# Patient Record
Sex: MALE | Race: WHITE | NOT HISPANIC OR LATINO | Employment: OTHER | URBAN - METROPOLITAN AREA
[De-identification: names, ages, dates, MRNs, and addresses within clinical notes are randomized per-mention and may not be internally consistent; named-entity substitution may affect disease eponyms.]

---

## 2018-01-12 LAB
A1C: 6.6
CHOLESTEROL, TOTAL: 137
EAG (MG/DL): 133.78 MG/DL
GLUCOSE: 224
HDLC SERPL-MCNC: 33 MG/DL
LDLC SERPL CALC-MCNC: 73 MG/DL (ref 0–160)
TRIGL SERPL-MCNC: 155 MG/DL

## 2018-05-08 ENCOUNTER — OFFICE VISIT (OUTPATIENT)
Dept: INTERNAL MEDICINE | Facility: CLINIC | Age: 68
End: 2018-05-08
Payer: MEDICARE

## 2018-05-08 VITALS
WEIGHT: 313.25 LBS | HEIGHT: 73 IN | TEMPERATURE: 97 F | BODY MASS INDEX: 41.52 KG/M2 | SYSTOLIC BLOOD PRESSURE: 138 MMHG | HEART RATE: 97 BPM | DIASTOLIC BLOOD PRESSURE: 80 MMHG | OXYGEN SATURATION: 97 %

## 2018-05-08 DIAGNOSIS — C91.51: Chronic | ICD-10-CM

## 2018-05-08 DIAGNOSIS — E66.01 CLASS 3 SEVERE OBESITY DUE TO EXCESS CALORIES WITH SERIOUS COMORBIDITY AND BODY MASS INDEX (BMI) OF 40.0 TO 44.9 IN ADULT: Chronic | ICD-10-CM

## 2018-05-08 DIAGNOSIS — G47.33 OBSTRUCTIVE SLEEP APNEA TREATED WITH CONTINUOUS POSITIVE AIRWAY PRESSURE (CPAP): Chronic | ICD-10-CM

## 2018-05-08 DIAGNOSIS — E11.42 CONTROLLED TYPE 2 DIABETES MELLITUS WITH DIABETIC POLYNEUROPATHY, WITHOUT LONG-TERM CURRENT USE OF INSULIN: Chronic | ICD-10-CM

## 2018-05-08 DIAGNOSIS — M19.91 PRIMARY LOCALIZED OSTEOARTHROSIS OF MULTIPLE SITES: Chronic | ICD-10-CM

## 2018-05-08 DIAGNOSIS — I10 BENIGN ESSENTIAL HYPERTENSION: Chronic | ICD-10-CM

## 2018-05-08 DIAGNOSIS — G89.29 OTHER CHRONIC PAIN: Chronic | ICD-10-CM

## 2018-05-08 DIAGNOSIS — F41.1 GENERALIZED ANXIETY DISORDER: Chronic | ICD-10-CM

## 2018-05-08 DIAGNOSIS — J30.1 SEASONAL ALLERGIC RHINITIS DUE TO POLLEN: Chronic | ICD-10-CM

## 2018-05-08 PROCEDURE — 99205 OFFICE O/P NEW HI 60 MIN: CPT | Mod: PBBFAC,PO | Performed by: FAMILY MEDICINE

## 2018-05-08 PROCEDURE — 99214 OFFICE O/P EST MOD 30 MIN: CPT | Mod: S$PBB,,, | Performed by: FAMILY MEDICINE

## 2018-05-08 PROCEDURE — 99999 PR PBB SHADOW E&M-NEW PATIENT-LVL V: CPT | Mod: PBBFAC,,, | Performed by: FAMILY MEDICINE

## 2018-05-08 RX ORDER — DESOXIMETASONE 2.5 MG/G
CREAM TOPICAL
COMMUNITY
Start: 2016-12-30

## 2018-05-08 RX ORDER — FLUTICASONE PROPIONATE 50 MCG
1 SPRAY, SUSPENSION (ML) NASAL
COMMUNITY

## 2018-05-08 RX ORDER — ESCITALOPRAM OXALATE 10 MG/1
10 TABLET ORAL
COMMUNITY
Start: 2018-02-14 | End: 2018-05-29

## 2018-05-08 RX ORDER — HYDROCODONE BITARTRATE AND ACETAMINOPHEN 10; 325 MG/1; MG/1
1 TABLET ORAL EVERY 6 HOURS PRN
Refills: 0 | COMMUNITY
Start: 2018-02-01

## 2018-05-08 RX ORDER — DEXTROSE 4 G
TABLET,CHEWABLE ORAL
COMMUNITY
Start: 2017-10-27 | End: 2018-07-26 | Stop reason: SDUPTHER

## 2018-05-08 RX ORDER — BLOOD-GLUCOSE CONTROL, NORMAL
30 EACH MISCELLANEOUS
COMMUNITY
Start: 2017-10-20 | End: 2018-07-26 | Stop reason: SDUPTHER

## 2018-05-08 RX ORDER — OXYBUTYNIN CHLORIDE 15 MG/1
15 TABLET, EXTENDED RELEASE ORAL DAILY
Refills: 0 | COMMUNITY
Start: 2018-04-10 | End: 2018-05-29

## 2018-05-08 RX ORDER — CLOTRIMAZOLE AND BETAMETHASONE DIPROPIONATE 10; .64 MG/G; MG/G
CREAM TOPICAL
Refills: 0 | COMMUNITY
Start: 2018-04-03 | End: 2018-07-26

## 2018-05-08 RX ORDER — LANCETS
EACH MISCELLANEOUS
COMMUNITY
Start: 2017-10-16 | End: 2018-05-29 | Stop reason: SDUPTHER

## 2018-05-08 RX ORDER — GLIMEPIRIDE 2 MG/1
TABLET ORAL
COMMUNITY
Start: 2017-11-07 | End: 2018-06-20 | Stop reason: SDUPTHER

## 2018-05-08 RX ORDER — TAMSULOSIN HYDROCHLORIDE 0.4 MG/1
CAPSULE ORAL
COMMUNITY
Start: 2018-02-14 | End: 2018-05-30 | Stop reason: SDUPTHER

## 2018-05-08 RX ORDER — CLONAZEPAM 1 MG/1
1 TABLET ORAL NIGHTLY
Refills: 0 | COMMUNITY
Start: 2018-04-17 | End: 2018-05-30 | Stop reason: SDUPTHER

## 2018-05-08 RX ORDER — NAPROXEN SODIUM 220 MG/1
81 TABLET, FILM COATED ORAL
COMMUNITY

## 2018-05-08 RX ORDER — KETOCONAZOLE 20 MG/G
CREAM TOPICAL
COMMUNITY
Start: 2017-07-17

## 2018-05-08 RX ORDER — TRAMADOL HYDROCHLORIDE 50 MG/1
TABLET ORAL
COMMUNITY
Start: 2017-11-14 | End: 2018-12-10 | Stop reason: SDUPTHER

## 2018-05-08 RX ORDER — IPRATROPIUM BROMIDE 42 UG/1
SPRAY, METERED NASAL
COMMUNITY
Start: 2017-11-01

## 2018-05-08 RX ORDER — MONTELUKAST SODIUM 10 MG/1
10 TABLET ORAL
COMMUNITY
Start: 2017-11-01

## 2018-05-08 RX ORDER — BENAZEPRIL HYDROCHLORIDE 20 MG/1
20 TABLET ORAL
COMMUNITY
End: 2018-05-29 | Stop reason: DRUGHIGH

## 2018-05-08 NOTE — PROGRESS NOTES
Patient answers are not available for this visit.    CHIEF COMPLAINT  Establish Care      This is my first time treating him here. All problems addressed today are NEW TO ME.    HISTORY OF PRESENT ILLNESS    PROBLEM/CONDITION: Primary complaint is chronic musculoskeletal pain problems related to prior trauma and prior surgeries and degenerative joint disease and degenerative spine disease. He is under the care of pain management specialist Dr. Kc. He says he is contemplating getting a second opinion regarding pain management, and I offered to schedule this for him, but he decided to defer.    PROBLEM/CONDITION: He reports compliance with use of his CPAP for treatment of his obstructive sleep apnea.    PROBLEM/CONDITION: Type 2 diabetes mellitus appears well-controlled. No symptomatic hypoglycemia or hyperglycemia reported.    PROBLEM/CONDITION: Hypertension appears well-controlled. No angina or angina equivalent symptoms reported.    PROBLEM/CONDITION: Anxiety appears fairly controlled. No paranoia or disordered thinking reported.    PROBLEM/CONDITION: Allergic rhinitis appears fairly controlled.    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    NARRATIVE HISTORY: He provides the following list of his prior treating physicians, and I am requesting relevant records from them: ENT  Dr. Coyle, spine specialist Dr. Patel, ENT Dr. Malcolm, orthopedic (ankle) surgeon Dr. Lopez, pulmonologist Dr. Tolliver, orthopedic (ankle) surgeon Dr. Reddy, orthopedic surgeon Dr. Jeffrey, gastroenterologist Dr. Monson, rheumatologist  Dr. Avila, urologist  Dr. Irizarry, allergist  Dr. Chao, orthopedic (hand) surgeon  Dr. French, dermatologist Dr. Yusuf, cardiologist  Dr. Gramajo, hematologist-oncologist Dr. Lyon, endocrinologist Dr. Cain, podiatrist Dr. Baldwin, and pain management specialist Dr. Kc. He  says that he had labs to evaluate the status of his diabetes and other conditions performed only 2 to 3 months ago, so it was agreed to defer further lab testing until I have had an opportunity to review his previous test results.    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)       Endocrine    Controlled type 2 diabetes mellitus with diabetic polyneuropathy, without long-term current use of insulin (Chronic)       Orthopedic    Primary localized osteoarthrosis of multiple sites (Chronic)       Other    Class 3 severe obesity due to excess calories with serious comorbidity and body mass index (BMI) of 40.0 to 44.9 in adult (Chronic)    Seasonal allergic rhinitis due to pollen (Chronic)    Obstructive sleep apnea treated with continuous positive airway pressure (CPAP) (Chronic)          PAST MEDICAL HISTORY REVIEWED AND UPDATED  Past Medical History:   Diagnosis Date    Adult T-cell leukemia in remission 5/8/2018    B12 deficiency     Benign essential hypertension 5/8/2018    Chronic pain (degenerative spine and joints, multiple sites) 5/8/2018    Class 3 severe obesity due to excess calories with serious comorbidity and body mass index (BMI) of 40.0 to 44.9 in adult 5/8/2018    GERD (gastroesophageal reflux disease)     Hyperlipidemia     Hypogonadism in male     Lumbar disc disease     OA (osteoarthritis of spine)     Obesity     Obstructive sleep apnea treated with continuous positive airway pressure (CPAP) 5/8/2018    PAF (paroxysmal atrial fibrillation)     Pancytopenia     Seasonal allergic rhinitis due to pollen 05/08/2018    Venous stasis dermatitis of both lower extremities        SURGICAL HISTORY REVIEWED AND  UPDATED  History reviewed. No pertinent surgical history.    CURRENT MEDICATION LIST REVIEWED AND UPDATED  Outpatient Prescriptions Marked as Taking for the 5/8/18 encounter (Office Visit) with YVON Galaviz MD   Medication Sig Dispense Refill    blood sugar diagnostic Strp Use to check blood sugar daily      blood sugar diagnostic Strp Use to check blood sugar daily      blood-glucose meter Misc Use to check blood sugar daily      desoximetasone (TOPICORT) 0.25 % cream Apply to affected area daily for 2 weeks      escitalopram oxalate (LEXAPRO) 10 MG tablet Take 10 mg by mouth.      glimepiride (AMARYL) 2 MG tablet take 1 tablet by mouth every morning      ipratropium (ATROVENT) 42 mcg (0.06 %) nasal spray instill 2 sprays into each nostril three times a day      ketoconazole (NIZORAL) 2 % cream Apply bid      lancets 30 gauge Misc 30 g by Other route.      lancets Misc Use to check blood sugar daily      montelukast (SINGULAIR) 10 mg tablet Take 10 mg by mouth.      SITagliptin (JANUVIA) 100 MG Tab take 1 tablet by mouth once daily      tamsulosin (FLOMAX) 0.4 mg Cp24 take 1 capsule by mouth once daily      traMADol (ULTRAM) 50 mg tablet 1-2 tabs po Q4 hr prn pain          ALLERGY LIST REVIEWED AND UPDATED  Allergies as of 05/08/2018 - Reviewed 05/08/2018   Allergen Reaction Noted    Levofloxacin  09/19/2016    Sulfa (sulfonamide antibiotics)  12/19/2015    Adhesive Rash 03/07/2016       SOCIAL HISTORY REVIEWED AND UPDATED  TOBACCO USE HISTORY:   History   Smoking Status    Never Smoker   Smokeless Tobacco    Never Used     ALCOHOL USE HISTORY:  History   Alcohol Use    Yes     Comment: rare     RECREATIONAL DRUG USE HISTORY:  History   Drug Use No       FAMILY HISTORY REVIEWED AND UPDATED  History reviewed. No pertinent family history.    REVIEW OF SYSTEMS  CONSTITUTIONAL: No fever reported.   CARDIOVASCULAR: No angina reported.   PULMONARY: No hemoptysis reported.   PSYCHIATRIC: No  "mood instability reported.   NEUROLOGIC: No seizures reported.   ENDOCRINE: No polydipsia reported.   GASTROINTESTINAL: No blood in stool reported.   GENITOURINARY: No hematuria reported.   ENT: No acute hearing changes reported.   OPHTHALMOLOGIC: No acute vision changes reported.   HEMATOLOGIC: No bleeding problems reported.   MUSCULOSKELETAL: No recent injuries reported.   DERMATOLOGIC: No skin infection reported.   REMAINDER OF COMPLETE REVIEW OF SYSTEMS is negative or noncontributory to present illness except as noted herein.    PHYSICAL EXAM  Vitals:    05/08/18 1007   BP: 138/80   BP Location: Right arm   Patient Position: Sitting   BP Method: Large (Manual)   Pulse: 97   Temp: 97.2 °F (36.2 °C)   TempSrc: Tympanic   SpO2: 97%   Weight: (!) 142.1 kg (313 lb 4.4 oz)   Height: 6' 1" (1.854 m)     CONSTITUTIONAL: Vital signs noted. No apparent distress. Does not appear acutely ill or septic. Appears adequately hydrated.   EYE: Sclerae anicteric. Lids and conjunctiva unremarkable.   ENT: External ENT unremarkable. Oropharynx moist.   NECK: Trachea midline. Thyroid nontender.   PULM: Lungs clear. Breathing unlabored.   CV: Auscultation reveals regular rate and rhythm without murmur, gallop or rub. No carotid bruit.   GI: Soft and nontender. Bowel sounds normal.   DERM: Skin warm and moist with normal turgor.   NEURO: There are no gross focal motor deficits or gross deficits of cranial nerves III-XII.   PSYCH: Alert and oriented x 3. Mood is grossly neutral. Affect appropriate. Judgment and insight not grossly compromised.   MSK: Grossly normal stance and gait for body habitus.    ASSESSMENT and PLAN  Controlled type 2 diabetes mellitus with diabetic polyneuropathy, without long-term current use of insulin    Class 3 severe obesity due to excess calories with serious comorbidity and body mass index (BMI) of 40.0 to 44.9 in adult    Chronic pain (degenerative spine and joints, multiple sites)    Adult T-cell leukemia " "in remission    Benign essential hypertension    Seasonal allergic rhinitis due to pollen    Obstructive sleep apnea treated with continuous positive airway pressure (CPAP)    Primary localized osteoarthrosis of multiple sites    Generalized anxiety disorder        PRESCRIPTION MEDICATION MANAGEMENT  Except as noted below, CURRENT MEDICATIONS are to remain unchanged from that listed above.     There are no discontinued medications.    Follow-up in about 2 weeks (around 5/22/2018) for re-evaluate problem(s) discussed today.    TOTAL TIME evaluating and managing this patient for this encounter exceeded 45 minutes, the majority spent counseling and coordinating care for the listed diagnoses.     ABOUT THIS DOCUMENTATION:  · The order of the conditions listed in the HPI is one of convenience and does not necessarily reflect the chronology of the appointment, nor the relative importance of a condition. It is possible that additional description or status details about condition(s) may be found elsewhere in the EHR documentation for today's encounter.  · Documentation entered by me for this encounter was done in part using speech-recognition technology. Although I have made an effort to ensure accuracy, "sound like" errors may exist and should be interpreted in context.                        -YVON Galaviz MD    There are no Patient Instructions on file for this visit.       "

## 2018-05-16 ENCOUNTER — PATIENT OUTREACH (OUTPATIENT)
Dept: ADMINISTRATIVE | Facility: HOSPITAL | Age: 68
End: 2018-05-16

## 2018-05-29 ENCOUNTER — LAB VISIT (OUTPATIENT)
Dept: LAB | Facility: HOSPITAL | Age: 68
End: 2018-05-29
Attending: FAMILY MEDICINE
Payer: MEDICARE

## 2018-05-29 ENCOUNTER — OFFICE VISIT (OUTPATIENT)
Dept: INTERNAL MEDICINE | Facility: CLINIC | Age: 68
End: 2018-05-29
Payer: MEDICARE

## 2018-05-29 VITALS
WEIGHT: 309.5 LBS | HEIGHT: 73 IN | OXYGEN SATURATION: 98 % | TEMPERATURE: 97 F | DIASTOLIC BLOOD PRESSURE: 62 MMHG | HEART RATE: 90 BPM | SYSTOLIC BLOOD PRESSURE: 108 MMHG | BODY MASS INDEX: 41.02 KG/M2

## 2018-05-29 DIAGNOSIS — G47.33 OBSTRUCTIVE SLEEP APNEA TREATED WITH CONTINUOUS POSITIVE AIRWAY PRESSURE (CPAP): Chronic | ICD-10-CM

## 2018-05-29 DIAGNOSIS — I10 BENIGN ESSENTIAL HYPERTENSION: Chronic | ICD-10-CM

## 2018-05-29 DIAGNOSIS — E11.42 CONTROLLED TYPE 2 DIABETES MELLITUS WITH DIABETIC POLYNEUROPATHY, WITHOUT LONG-TERM CURRENT USE OF INSULIN: Chronic | ICD-10-CM

## 2018-05-29 DIAGNOSIS — G89.29 OTHER CHRONIC PAIN: Primary | Chronic | ICD-10-CM

## 2018-05-29 DIAGNOSIS — E66.01 CLASS 3 SEVERE OBESITY DUE TO EXCESS CALORIES WITH SERIOUS COMORBIDITY AND BODY MASS INDEX (BMI) OF 40.0 TO 44.9 IN ADULT: Chronic | ICD-10-CM

## 2018-05-29 DIAGNOSIS — M19.91 PRIMARY LOCALIZED OSTEOARTHROSIS OF MULTIPLE SITES: Chronic | ICD-10-CM

## 2018-05-29 LAB
ALBUMIN SERPL BCP-MCNC: 3.7 G/DL
ALP SERPL-CCNC: 80 U/L
ALT SERPL W/O P-5'-P-CCNC: 48 U/L
ANION GAP SERPL CALC-SCNC: 7 MMOL/L
AST SERPL-CCNC: 28 U/L
BILIRUB SERPL-MCNC: 1 MG/DL
BUN SERPL-MCNC: 15 MG/DL
CALCIUM SERPL-MCNC: 9.2 MG/DL
CHLORIDE SERPL-SCNC: 110 MMOL/L
CHOLEST SERPL-MCNC: 148 MG/DL
CHOLEST/HDLC SERPL: 4.1 {RATIO}
CO2 SERPL-SCNC: 26 MMOL/L
CREAT SERPL-MCNC: 0.8 MG/DL
EST. GFR  (AFRICAN AMERICAN): >60 ML/MIN/1.73 M^2
EST. GFR  (NON AFRICAN AMERICAN): >60 ML/MIN/1.73 M^2
ESTIMATED AVG GLUCOSE: 134 MG/DL
GLUCOSE SERPL-MCNC: 178 MG/DL
HBA1C MFR BLD HPLC: 6.3 %
HDLC SERPL-MCNC: 36 MG/DL
HDLC SERPL: 24.3 %
LDLC SERPL CALC-MCNC: 74.2 MG/DL
NONHDLC SERPL-MCNC: 112 MG/DL
POTASSIUM SERPL-SCNC: 4.5 MMOL/L
PROT SERPL-MCNC: 7.3 G/DL
SODIUM SERPL-SCNC: 143 MMOL/L
TRIGL SERPL-MCNC: 189 MG/DL
TSH SERPL DL<=0.005 MIU/L-ACNC: 1.61 UIU/ML

## 2018-05-29 PROCEDURE — 99214 OFFICE O/P EST MOD 30 MIN: CPT | Mod: S$PBB,,, | Performed by: FAMILY MEDICINE

## 2018-05-29 PROCEDURE — 99999 PR PBB SHADOW E&M-EST. PATIENT-LVL V: CPT | Mod: PBBFAC,,, | Performed by: FAMILY MEDICINE

## 2018-05-29 PROCEDURE — 80061 LIPID PANEL: CPT

## 2018-05-29 PROCEDURE — 83036 HEMOGLOBIN GLYCOSYLATED A1C: CPT

## 2018-05-29 PROCEDURE — 80053 COMPREHEN METABOLIC PANEL: CPT

## 2018-05-29 PROCEDURE — 84443 ASSAY THYROID STIM HORMONE: CPT

## 2018-05-29 PROCEDURE — 36415 COLL VENOUS BLD VENIPUNCTURE: CPT | Mod: PO

## 2018-05-29 PROCEDURE — 99215 OFFICE O/P EST HI 40 MIN: CPT | Mod: PBBFAC,PO | Performed by: FAMILY MEDICINE

## 2018-05-29 RX ORDER — BENAZEPRIL HYDROCHLORIDE 10 MG/1
TABLET ORAL
Refills: 1 | COMMUNITY
Start: 2018-05-13 | End: 2018-05-29

## 2018-05-29 RX ORDER — CEFUROXIME AXETIL 250 MG/1
250 TABLET ORAL 2 TIMES DAILY
Refills: 0 | COMMUNITY
Start: 2018-05-16 | End: 2018-07-26

## 2018-05-29 RX ORDER — BENAZEPRIL HYDROCHLORIDE 5 MG/1
5 TABLET ORAL DAILY
Qty: 30 TABLET | Refills: 1 | Status: SHIPPED | OUTPATIENT
Start: 2018-05-29 | End: 2018-07-26

## 2018-05-29 NOTE — LETTER
PHYSICIAN ORDERS    PATIENT:   Dalton Garcia Jr.,  1950  DATE OF ORDER:  18    PHYSICIAN ORDER:  [facility] Program    CLINICAL INDICATION:  Deconditioning    ORDER IN DETAIL: Please evaluate Robin and initiate a comprehensive three-month wellness program to include appropriate cardiovascular, resistive, and flexibility training, along with education in exercise, nutrition, and lifestyle changes. Please send status reports to Dr. Galaviz at the address captioned above.       YVON Galaviz MD       · NOTE TO PATIENT: The [facility] program has been designed to assist you in making positive changes in your health through individualized exercise and lifestyle education.    · INSTRUCTION TO PATIENT: Call the [facility] Information Line at [phone] to set up your initial appointment. There is no cost for this visit and you are under no obligation to participate in the program. The appointment is used to explain the program, allow you to see the facility most convenient for you, and discuss options for your particular situation.     · PLEASE CONSIDER THIS AN INVESTMENT IN YOUR HEALTH and a cost-effective alternative to popular medicines for diabetes, high blood pressure, high cholesterol, heart disease, and gout, each of which can cost $100 to $300+ per month.

## 2018-05-29 NOTE — PROGRESS NOTES
CHIEF COMPLAINT  Diabetes (follow up)  Re-evaluate the status of multiple medical problems.     HISTORY OF PRESENT ILLNESS    PROBLEM/CONDITION: His primary concern is unsatisfactorily controlled musculoskeletal pain problems related to degenerative joint disease and degenerative spine disease. We have requested, but have not yet received, records of prior treatment. He reports no new/changing pain complaints.    PROBLEM/CONDITION: He is on benazepril 20 mg daily for treatment of hypertension. He does not have congestive heart failure or other compelling indication for ACE inhibitor use. His blood pressures have been BELOW the therapeutic goal. It was decided to give him a trial of reduced dose and reevaluation for consideration of discontinuation of this medication.    PROBLEM/CONDITION: Type 2 diabetes mellitus appears well-controlled. No symptomatic hypoglycemia or hyperglycemia reported.    PROBLEM/CONDITION: He reports increased efforts at healthy eating and has lost weight intentionally, reducing his BMI to 40.8 kg/m2 (class 3 obesity).    PROBLEM/CONDITION: He reports compliance with use of his CPAP for treatment of his obstructive sleep apnea.    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    No other complaint or concerns reported except as noted herein.     Problem List Items Addressed This Visit        Neuro    Chronic pain (degenerative spine and joints, multiple sites) - Primary (Chronic)    Relevant Orders    Ambulatory referral to Pain Clinic       Cardiac/Vascular    Benign essential hypertension (Chronic)    Current Assessment & Plan     BP Readings from Last 3 Encounters:   05/29/18 108/62   05/08/18 138/80            Relevant Medications    benazepril (LOTENSIN) 5 MG tablet    Other Relevant Orders    COMPREHENSIVE METABOLIC PANEL    Lipid panel    TSH    NURSING COMMUNICATION: Create [employer] Account     Hypertension [employer] [facility] ([facility]) Enrollment Order (Completed)    Hypertension Digital Medicine ([facility]): Assign Onboarding Questionnaires (Completed)       Endocrine    Controlled type 2 diabetes mellitus with diabetic polyneuropathy, without long-term current use of insulin (Chronic)    Relevant Orders    COMPREHENSIVE METABOLIC PANEL    Lipid panel    HEMOGLOBIN A1C    TSH    Microalbumin/creatinine urine ratio       Orthopedic    Primary localized osteoarthrosis of multiple sites (Chronic)    Relevant Orders    Ambulatory referral to Pain Clinic       Other    Class 3 severe obesity due to excess calories with serious comorbidity and body mass index (BMI) of 40.0 to 44.9 in adult (Chronic)    Obstructive sleep apnea treated with continuous positive airway pressure (CPAP) (Chronic)          PAST MEDICAL HISTORY, FAMILY HISTORY and SOCIAL HISTORY reviewed by me (YVON Galaviz MD) and are updated consistent with the patient's report.    CURRENT MEDICATION LIST, and ALLERGY LIST reviewed by me (YVON Galaviz MD) and are updated consistent with the patient's report as follows:  Medication Sig   benazepril (LOTENSIN) 20 MG tablet Take 1 tablet daily   aspirin 81 MG Chew Take 81 mg by mouth.   blood sugar diagnostic Strp Use to check blood sugar daily   blood-glucose meter Misc Use to check blood sugar daily   cefUROXime (CEFTIN) 250 MG tablet Take 250 mg by mouth 2 (two) times daily.   clonazePAM (KLONOPIN) 1 MG tablet Take 1 mg by mouth every evening.   clotrimazole-betamethasone 1-0.05% (LOTRISONE) cream APPLY TO AFFECTED EAR AT NIGHT FOR 1 MONTH   desoximetasone (TOPICORT) 0.25 % cream Apply to affected area daily for 2 weeks   fluticasone (FLONASE) 50 mcg/actuation nasal spray 1 spray by Nasal route.   glimepiride (AMARYL) 2 MG tablet take 1 tablet by mouth every morning   hydrocodone-acetaminophen 10-325mg tab Take 1 tablet by mouth every 6 (six) hours as needed.   ipratropium (ATROVENT) 42 mcg  (0.06 %) nasal spray instill 2 sprays into each nostril three times a day   ketoconazole (NIZORAL) 2 % cream Apply bid   lancets 30 gauge Misc 30 g by Other route.   montelukast (SINGULAIR) 10 mg tablet Take 10 mg by mouth.   multivit-min-FA-lycopen-lutein 300-600-300 mcg Tab Take by mouth.   SITagliptin (JANUVIA) 100 MG Tab take 1 tablet by mouth once daily   tamsulosin (FLOMAX) 0.4 mg Cp24 take 1 capsule by mouth once daily   traMADol (ULTRAM) 50 mg tablet 1-2 tabs po Q4 hr prn pain        Outpatient Encounter Prescriptions as of 5/29/2018   Medication Sig Dispense Refill    aspirin 81 MG Chew Take 81 mg by mouth.      benazepril (LOTENSIN) 20 MG tablet Take 20 mg by mouth.      blood sugar diagnostic Strp Use to check blood sugar daily      blood-glucose meter Misc Use to check blood sugar daily      cefUROXime (CEFTIN) 250 MG tablet Take 250 mg by mouth 2 (two) times daily.  0    clonazePAM (KLONOPIN) 1 MG tablet Take 1 mg by mouth every evening.  0    clotrimazole-betamethasone 1-0.05% (LOTRISONE) cream APPLY TO AFFECTED EAR AT NIGHT FOR 1 MONTH  0    desoximetasone (TOPICORT) 0.25 % cream Apply to affected area daily for 2 weeks      fluticasone (FLONASE) 50 mcg/actuation nasal spray 1 spray by Nasal route.      glimepiride (AMARYL) 2 MG tablet take 1 tablet by mouth every morning      hydrocodone-acetaminophen 10-325mg (NORCO)  mg Tab Take 1 tablet by mouth every 6 (six) hours as needed.  0    ipratropium (ATROVENT) 42 mcg (0.06 %) nasal spray instill 2 sprays into each nostril three times a day      ketoconazole (NIZORAL) 2 % cream Apply bid      lancets 30 gauge Misc 30 g by Other route.      montelukast (SINGULAIR) 10 mg tablet Take 10 mg by mouth.      multivit-min-FA-lycopen-lutein 300-600-300 mcg Tab Take by mouth.      SITagliptin (JANUVIA) 100 MG Tab take 1 tablet by mouth once daily      tamsulosin (FLOMAX) 0.4 mg Cp24 take 1 capsule by mouth once daily      traMADol (ULTRAM)  "50 mg tablet 1-2 tabs po Q4 hr prn pain      [DISCONTINUED] benazepril (LOTENSIN) 10 MG tablet   1    [DISCONTINUED] blood sugar diagnostic Strp Use to check blood sugar daily      [DISCONTINUED] escitalopram oxalate (LEXAPRO) 10 MG tablet Take 10 mg by mouth.      [DISCONTINUED] lancets Misc Use to check blood sugar daily      [DISCONTINUED] oxybutynin (DITROPAN XL) 15 MG TR24 Take 15 mg by mouth once daily.  0     No facility-administered encounter medications on file as of 5/29/2018.      Allergies as of 05/29/2018 - Reviewed 05/29/2018   Allergen Reaction Noted    Levofloxacin  09/19/2016    Sulfa (sulfonamide antibiotics)  12/19/2015    Adhesive Rash 03/07/2016       REVIEW OF SYSTEMS  CARDIOVASCULAR: No angina or orthopnea reported.   ENDOCRINE: No polyuria or polydipsia reported.     PHYSICAL EXAM  Vitals:    05/29/18 1009   BP: 108/62   BP Location: Right arm   Patient Position: Sitting   BP Method: Large (Manual)   Pulse: 90   Temp: 97.2 °F (36.2 °C)   TempSrc: Tympanic   SpO2: 98%   Weight: (!) 140.4 kg (309 lb 8.4 oz)   Height: 6' 1" (1.854 m)     CONSTITUTIONAL: Vital signs noted. No apparent distress. Does not appear acutely ill or septic. Appears adequately hydrated.  HEENT: External ENT grossly unremarkable. Hearing grossly intact. Oropharynx moist.  PULM: Lungs clear. Breathing unlabored.  HEART: Auscultation reveals regular rate and rhythm without murmur, gallop or rub.  DERM: Skin warm and moist with normal turgor.  NEURO: There are no gross focal motor deficits or gross deficits of cranial nerves III-XII.  PSYCHIATRIC: Alert and oriented x 3. Mood is grossly neutral. Affect appropriate. Judgment and insight not grossly compromised.  MUSCULOSKELETAL: Grossly normal stance and gait for body habitus.    ASSESSMENT and PLAN  Chronic pain (degenerative spine and joints, multiple sites)  -     Ambulatory referral to Pain Clinic    Obstructive sleep apnea treated with continuous positive airway " pressure (CPAP)    Class 3 severe obesity due to excess calories with serious comorbidity and body mass index (BMI) of 40.0 to 44.9 in adult    Primary localized osteoarthrosis of multiple sites  -     Ambulatory referral to Pain Clinic    Controlled type 2 diabetes mellitus with diabetic polyneuropathy, without long-term current use of insulin  -     COMPREHENSIVE METABOLIC PANEL; Future; Expected date: 05/29/2018  -     Lipid panel; Future; Expected date: 05/29/2018  -     HEMOGLOBIN A1C; Future; Expected date: 05/29/2018  -     TSH; Future; Expected date: 05/29/2018  -     Microalbumin/creatinine urine ratio    Benign essential hypertension  -     benazepril (LOTENSIN) 5 MG tablet; Take 1 tablet (5 mg total) by mouth once daily.  Dispense: 30 tablet; Refill: 1  -     COMPREHENSIVE METABOLIC PANEL; Future; Expected date: 05/29/2018  -     Lipid panel; Future; Expected date: 05/29/2018  -     TSH; Future; Expected date: 05/29/2018  -     NURSING COMMUNICATION: Create [employer] Account  -     Hypertension [employer] [facility] ([facility]) Enrollment Order  -     Hypertension Digital Medicine ([facility]): Assign Onboarding Questionnaires        PRESCRIPTION MEDICATION MANAGEMENT  Except as noted below, CURRENT MEDICATIONS are to remain unchanged from that listed above.    Medications Ordered This Encounter      benazepril (LOTENSIN) 5 MG tablet          Sig: Take 1 tablet (5 mg total) by mouth once daily.          Dispense:  30 tablet          Refill:  1          NOTE DOSE CHANGE. This REPLACES any prior prescription for this drug. DISCONTINUE any prior prescription for this drug.  Medications Discontinued During This Encounter   Medication Reason    blood sugar diagnostic Strp Duplicate Order    benazepril (LOTENSIN) 10 MG tablet Patient no longer taking    escitalopram oxalate (LEXAPRO) 10 MG tablet Patient no longer taking    lancets Misc Duplicate Order    oxybutynin (DITROPAN XL) 15 MG TR24 Patient no longer taking     "benazepril (LOTENSIN) 20 MG tablet Dose adjustment       Follow-up in about 3 weeks (around 6/19/2018).    ABOUT THIS DOCUMENTATION:  · The order of the conditions listed in the HPI is one of convenience and does not necessarily reflect the chronology of the appointment, nor the relative importance of a condition. It is possible that additional description or status details about condition(s) may be found elsewhere in the EHR documentation for today's encounter.  · Documentation entered by me for this encounter was done in part using speech-recognition technology. Although I have made an effort to ensure accuracy, "sound like" errors may exist and should be interpreted in context.                        -YVON Galaviz MD    There are no Patient Instructions on file for this visit.       "

## 2018-05-30 ENCOUNTER — TELEPHONE (OUTPATIENT)
Dept: INTERNAL MEDICINE | Facility: CLINIC | Age: 68
End: 2018-05-30

## 2018-05-30 DIAGNOSIS — F41.1 GENERALIZED ANXIETY DISORDER: Primary | Chronic | ICD-10-CM

## 2018-05-30 DIAGNOSIS — E11.42 CONTROLLED TYPE 2 DIABETES MELLITUS WITH DIABETIC POLYNEUROPATHY, WITHOUT LONG-TERM CURRENT USE OF INSULIN: Chronic | ICD-10-CM

## 2018-05-30 DIAGNOSIS — N40.0 BENIGN PROSTATIC HYPERPLASIA WITHOUT LOWER URINARY TRACT SYMPTOMS: Chronic | ICD-10-CM

## 2018-06-05 PROBLEM — N40.0 BENIGN PROSTATIC HYPERPLASIA WITHOUT LOWER URINARY TRACT SYMPTOMS: Chronic | Status: ACTIVE | Noted: 2018-06-05

## 2018-06-05 RX ORDER — SITAGLIPTIN 100 MG/1
TABLET, FILM COATED ORAL
Qty: 90 TABLET | Refills: 0 | Status: SHIPPED | OUTPATIENT
Start: 2018-06-05 | End: 2018-07-31 | Stop reason: SDUPTHER

## 2018-06-05 RX ORDER — TAMSULOSIN HYDROCHLORIDE 0.4 MG/1
CAPSULE ORAL
Qty: 90 CAPSULE | Refills: 0 | Status: SHIPPED | OUTPATIENT
Start: 2018-06-05 | End: 2018-11-09 | Stop reason: SDUPTHER

## 2018-06-05 RX ORDER — CLONAZEPAM 1 MG/1
1 TABLET ORAL NIGHTLY
Qty: 30 TABLET | Refills: 0 | Status: SHIPPED | OUTPATIENT
Start: 2018-06-05 | End: 2018-07-26 | Stop reason: SDUPTHER

## 2018-06-05 NOTE — TELEPHONE ENCOUNTER
PLEASE NOTIFY PATIENT:  Clonazepam is a controlled drug. I usually do not give prescriptions for controlled drugs outside of office visits. I made an exception for him this time. Also, I do not recall us previously talking about his BPH symptoms. I'm happy to take care of him and provide him the refills he needs. However, we need to address these during his office appointments. Please have them come in prior to needing an any additional refills.  (NOT URGENT: Can defer to tomorrow.)

## 2018-06-06 NOTE — TELEPHONE ENCOUNTER
Notified patient of Dr. Galaivz's courtesy refills and need for patient to come in for appointments to obtain any future refills. Patient verbalized understanding. He also mentioned he will likely undergo an ankle fusion SX in the future. Encouraged patient to self-scheduled utilizing the [employer] application. Patient verbalized understanding of application usage, thanked me for call, and ended call.

## 2018-06-08 ENCOUNTER — PATIENT MESSAGE (OUTPATIENT)
Dept: INTERNAL MEDICINE | Facility: CLINIC | Age: 68
End: 2018-06-08

## 2018-06-11 ENCOUNTER — PATIENT MESSAGE (OUTPATIENT)
Dept: ADMINISTRATIVE | Facility: OTHER | Age: 68
End: 2018-06-11

## 2018-06-21 RX ORDER — GLIMEPIRIDE 2 MG/1
TABLET ORAL
Qty: 90 TABLET | Refills: 3 | Status: SHIPPED | OUTPATIENT
Start: 2018-06-21 | End: 2019-07-09 | Stop reason: SDUPTHER

## 2018-07-02 ENCOUNTER — PATIENT MESSAGE (OUTPATIENT)
Dept: INTERNAL MEDICINE | Facility: CLINIC | Age: 68
End: 2018-07-02

## 2018-07-20 DIAGNOSIS — I10 BENIGN ESSENTIAL HYPERTENSION: Chronic | ICD-10-CM

## 2018-07-26 ENCOUNTER — OFFICE VISIT (OUTPATIENT)
Dept: INTERNAL MEDICINE | Facility: CLINIC | Age: 68
End: 2018-07-26
Payer: MEDICARE

## 2018-07-26 VITALS
OXYGEN SATURATION: 98 % | DIASTOLIC BLOOD PRESSURE: 76 MMHG | BODY MASS INDEX: 41.43 KG/M2 | WEIGHT: 312.63 LBS | HEART RATE: 91 BPM | HEIGHT: 73 IN | TEMPERATURE: 98 F | SYSTOLIC BLOOD PRESSURE: 142 MMHG

## 2018-07-26 DIAGNOSIS — F41.1 GENERALIZED ANXIETY DISORDER: Chronic | ICD-10-CM

## 2018-07-26 DIAGNOSIS — M19.91 PRIMARY LOCALIZED OSTEOARTHROSIS OF MULTIPLE SITES: Chronic | ICD-10-CM

## 2018-07-26 DIAGNOSIS — E11.42 CONTROLLED TYPE 2 DIABETES MELLITUS WITH DIABETIC POLYNEUROPATHY, WITHOUT LONG-TERM CURRENT USE OF INSULIN: Primary | Chronic | ICD-10-CM

## 2018-07-26 DIAGNOSIS — N40.0 BENIGN PROSTATIC HYPERPLASIA WITHOUT LOWER URINARY TRACT SYMPTOMS: Chronic | ICD-10-CM

## 2018-07-26 DIAGNOSIS — I10 BENIGN ESSENTIAL HYPERTENSION: Chronic | ICD-10-CM

## 2018-07-26 DIAGNOSIS — G89.29 OTHER CHRONIC PAIN: Chronic | ICD-10-CM

## 2018-07-26 DIAGNOSIS — G47.33 OBSTRUCTIVE SLEEP APNEA TREATED WITH CONTINUOUS POSITIVE AIRWAY PRESSURE (CPAP): Chronic | ICD-10-CM

## 2018-07-26 PROCEDURE — 99214 OFFICE O/P EST MOD 30 MIN: CPT | Mod: S$PBB,,, | Performed by: FAMILY MEDICINE

## 2018-07-26 PROCEDURE — 99999 PR PBB SHADOW E&M-EST. PATIENT-LVL V: CPT | Mod: PBBFAC,,, | Performed by: FAMILY MEDICINE

## 2018-07-26 PROCEDURE — 99215 OFFICE O/P EST HI 40 MIN: CPT | Mod: PBBFAC,PO | Performed by: FAMILY MEDICINE

## 2018-07-26 RX ORDER — CLONAZEPAM 1 MG/1
1 TABLET ORAL NIGHTLY
Qty: 30 TABLET | Refills: 1 | Status: SHIPPED | OUTPATIENT
Start: 2018-07-26 | End: 2018-10-18 | Stop reason: SDUPTHER

## 2018-07-26 RX ORDER — LOSARTAN POTASSIUM 25 MG/1
25 TABLET ORAL DAILY
Qty: 90 TABLET | Refills: 0 | Status: SHIPPED | OUTPATIENT
Start: 2018-07-26 | End: 2018-08-17 | Stop reason: SDUPTHER

## 2018-07-26 RX ORDER — INSULIN PUMP SYRINGE, 3 ML
EACH MISCELLANEOUS
Qty: 1 EACH | Refills: 0 | Status: SHIPPED | OUTPATIENT
Start: 2018-07-26

## 2018-07-26 RX ORDER — LANCETS
EACH MISCELLANEOUS
Qty: 50 EACH | Refills: 11 | Status: SHIPPED | OUTPATIENT
Start: 2018-07-26

## 2018-07-26 NOTE — PROGRESS NOTES
CHIEF COMPLAINT  Anxiety      HISTORY OF PRESENT ILLNESS    Problem List Items Addressed This Visit        Neuro    Chronic pain (degenerative spine and joints, multiple sites) (Chronic)    Current Assessment & Plan     This condition appears to be STABLE. Pain management consult already ordered.            Psychiatric    Generalized anxiety disorder (Chronic)    Current Assessment & Plan     Moderate in severity, exacerbated by life stressors. We discussed risks and benefits of treatment options. He reports no history of drug abuse dependency.  [facility] Controlled Prescription Drug Monitoring database was queried and showed no activity to suggest abuse, diversion, or other inappropriate use of prescription medications.          Relevant Medications    clonazePAM (KLONOPIN) 1 MG tablet    Other Relevant Orders    Ambulatory referral to Psychology       Cardiac/Vascular    Benign essential hypertension (Chronic)    Current Assessment & Plan     --------------------------------------------------------------------------------  RELEVANT DATA REVIEWED:  BP Readings from Last 6 Encounters:   07/26/18 (!) 142/76   05/29/18 108/62   05/08/18 138/80     Wt Readings from Last 2 Encounters:   07/26/18 (!) 141.8 kg (312 lb 9.8 oz)   05/29/18 (!) 140.4 kg (309 lb 8.4 oz)     BMI Readings from Last 2 Encounters:   07/26/18 41.24 kg/m²   05/29/18 40.84 kg/m²     Lab Results   Component Value Date    ESTGFRAFRICA >60.0 05/29/2018    EGFRNONAA >60.0 05/29/2018    CREATININE 0.8 05/29/2018    BUN 15 05/29/2018       The 10-year ASCVD risk score (Ramon PERKINS Jr., et al., 2013) is: 37.1%    Values used to calculate the score:      Age: 67 years      Sex: Male      Is Non- : No      Diabetic: Yes      Tobacco smoker: No      Systolic Blood Pressure: 142 mmHg      Is BP treated: Yes      HDL Cholesterol: 36 mg/dL      Total Cholesterol: 148 mg/dL     This condition appears to be MODESTLY WORSE.  Therapeutic lifestyle changes encouraged.   --------------------------------------------------------------------------------           Relevant Medications    losartan (COZAAR) 25 MG tablet       Renal/    Benign prostatic hyperplasia without lower urinary tract symptoms (Chronic)    Current Assessment & Plan     No dysuria or hematuria reported. We discussed risks and benefits of treatment options.  He agreed to see urologist for second opinion.          Relevant Orders    Ambulatory referral to Urology       Endocrine    Controlled type 2 diabetes mellitus with diabetic polyneuropathy, without long-term current use of insulin - Primary (Chronic)    Relevant Medications    blood-glucose meter kit    lancets Misc    blood sugar diagnostic Strp       Orthopedic    Primary localized osteoarthrosis of multiple sites (Chronic)    Current Assessment & Plan     This condition appears to be STABLE. Pain management consult already ordered.            Other    Obstructive sleep apnea treated with continuous positive airway pressure (CPAP) (Chronic)    Current Assessment & Plan     He reports compliance with use of CPAP for treatment of obstructive sleep apnea, and he reports perceived therapeutic benefit.               PAST MEDICAL HISTORY, FAMILY HISTORY and SOCIAL HISTORY reviewed by me (YVON Galaviz MD) and are updated consistent with the patient's report.    CURRENT MEDICATION LIST, and ALLERGY LIST reviewed by me (YVON Galaviz MD) and are updated consistent with the patient's report as follows:    Outpatient Medications Prior to Visit   Medication Sig Dispense Refill    aspirin 81 MG Chew Take 81 mg by mouth.      desoximetasone (TOPICORT) 0.25 % cream Apply to affected area daily for 2 weeks      fluticasone (FLONASE) 50 mcg/actuation nasal spray 1 spray by Nasal route.      glimepiride (AMARYL) 2 MG tablet take 1 tablet by mouth every morning 90 tablet 3    hydrocodone-acetaminophen 10-325mg (NORCO)  " mg Tab Take 1 tablet by mouth every 6 (six) hours as needed.  0    ipratropium (ATROVENT) 42 mcg (0.06 %) nasal spray instill 2 sprays into each nostril three times a day      ketoconazole (NIZORAL) 2 % cream Apply bid      montelukast (SINGULAIR) 10 mg tablet Take 10 mg by mouth.      multivit-min-FA-lycopen-lutein 300-600-300 mcg Tab Take by mouth.      tamsulosin (FLOMAX) 0.4 mg Cp24 take 1 capsules by mouth once daily 90 capsule 0    traMADol (ULTRAM) 50 mg tablet 1-2 tabs po Q4 hr prn pain      blood sugar diagnostic Strp Use to check blood sugar daily      blood-glucose meter Misc Use to check blood sugar daily      clonazePAM (KLONOPIN) 1 MG tablet Take 1 tablet (1 mg total) by mouth every evening. - APPOINTMENT REQUIRED FOR MORE REFILLS 30 tablet 0    JANUVIA 100 mg Tab take 1 tablet by mouth once daily 90 tablet 0    lancets 30 gauge Misc 30 g by Other route.      benazepril (LOTENSIN) 5 MG tablet Take 1 tablet (5 mg total) by mouth once daily. 30 tablet 1    cefUROXime (CEFTIN) 250 MG tablet Take 250 mg by mouth 2 (two) times daily.  0    clotrimazole-betamethasone 1-0.05% (LOTRISONE) cream APPLY TO AFFECTED EAR AT NIGHT FOR 1 MONTH  0     No facility-administered medications prior to visit.        Allergies as of 07/26/2018 - Reviewed 07/26/2018   Allergen Reaction Noted    Levofloxacin  09/19/2016    Sulfa (sulfonamide antibiotics)  12/19/2015    Adhesive Rash 03/07/2016       REVIEW OF SYSTEMS  CONSTITUTIONAL: No fever reported.  CARDIOVASCULAR: No chest pain reported.  PULMONARY: No trouble breathing reported.   PSYCHIATRIC: No mood instability reported.    PHYSICAL EXAM  Vitals:    07/26/18 1516   BP: (!) 142/76   BP Location: Right arm   Patient Position: Sitting   BP Method: Medium (Manual)   Pulse: 91   Temp: 98 °F (36.7 °C)   TempSrc: Tympanic   SpO2: 98%   Weight: (!) 141.8 kg (312 lb 9.8 oz)   Height: 6' 1" (1.854 m)     CONSTITUTIONAL: Vital signs noted. No apparent " distress. Does not appear acutely ill or septic. Appears adequately hydrated.  HEENT: External ENT grossly unremarkable. Hearing grossly intact. Oropharynx moist.  PULM: Lungs clear. Breathing unlabored.  HEART: Auscultation reveals regular rate and rhythm without murmur, gallop or rub.  DERM: Skin warm and moist with normal turgor.  NEURO: There are no gross focal motor deficits or gross deficits of cranial nerves III-XII.  PSYCHIATRIC: Alert and oriented x 3. Mood is grossly neutral. Affect somewhat anxious. Judgment and insight not grossly compromised.     ASSESSMENT and PLAN  Controlled type 2 diabetes mellitus with diabetic polyneuropathy, without long-term current use of insulin  -     blood-glucose meter kit; Check blood glucose 1 times daily as directed and as needed (dispense insurance preferred brand or patient choice  Dispense: 1 each; Refill: 0  -     lancets Misc; Check blood glucose 1 times daily as directed and as needed (dispense insurance preferred brand or patient choice)  Dispense: 50 each; Refill: 11  -     blood sugar diagnostic Strp; Check blood glucose 1 times daily as directed and as needed (dispense insurance preferred brand or patient choice)  Dispense: 50 each; Refill: 11    Chronic pain (degenerative spine and joints, multiple sites)    Generalized anxiety disorder  -     Ambulatory referral to Psychology  -     clonazePAM (KLONOPIN) 1 MG tablet; Take 1 tablet (1 mg total) by mouth every evening.  Dispense: 30 tablet; Refill: 1    Benign essential hypertension  -     losartan (COZAAR) 25 MG tablet; Take 1 tablet (25 mg total) by mouth once daily.  Dispense: 90 tablet; Refill: 0    Benign prostatic hyperplasia without lower urinary tract symptoms  -     Ambulatory referral to Urology    Obstructive sleep apnea treated with continuous positive airway pressure (CPAP)    Primary localized osteoarthrosis of multiple sites        PRESCRIPTION MEDICATION MANAGEMENT  Except as noted below,  CURRENT MEDICATIONS are to remain unchanged from that listed above.    Medications Ordered This Encounter      blood sugar diagnostic Strp          Sig: Check blood glucose 1 times daily as directed and as needed (dispense insurance preferred brand or patient choice)          Dispense:  50 each          Refill:  11          generic/brand therapeutic substitution ok, including Freestyle Ke or other continuous glucose monitor with equivalent supplies -- use insurance-preferred brand unless patient requests otherwise      blood-glucose meter kit          Sig: Check blood glucose 1 times daily as directed and as needed (dispense insurance preferred brand or patient choice          Dispense:  1 each          Refill:  0          generic/brand therapeutic substitution ok, including Freestyle Ke or other continuous glucose monitor with equivalent supplies -- use insurance-preferred brand unless patient requests otherwise      clonazePAM (KLONOPIN) 1 MG tablet          Sig: Take 1 tablet (1 mg total) by mouth every evening.          Dispense:  30 tablet          Refill:  1      lancets Misc          Sig: Check blood glucose 1 times daily as directed and as needed (dispense insurance preferred brand or patient choice)          Dispense:  50 each          Refill:  11          generic/brand therapeutic substitution ok, including Freestyle Ke or other continuous glucose monitor with equivalent supplies -- use insurance-preferred brand unless patient requests otherwise      losartan (COZAAR) 25 MG tablet          Sig: Take 1 tablet (25 mg total) by mouth once daily.          Dispense:  90 tablet          Refill:  0          IMPORTANT!  This REPLACES benazepril. DISCONTINUE any existing prescription for benazepril.  Medications Discontinued During This Encounter   Medication Reason    benazepril (LOTENSIN) 5 MG tablet Patient no longer taking    cefUROXime (CEFTIN) 250 MG tablet Patient no longer taking     "clotrimazole-betamethasone 1-0.05% (LOTRISONE) cream Patient no longer taking    blood sugar diagnostic Strp Reorder    lancets 30 gauge Misc Reorder    blood-glucose meter Misc Reorder    clonazePAM (KLONOPIN) 1 MG tablet Reorder       Follow-up in about 1 month (around 8/26/2018).    Patient Instructions   Call to schedule your appointment with:    David Vasques, [facility]  [address] [place] [address]  PHONE: [phone]  FAX: [phone]          ABOUT THIS DOCUMENTATION:  · The order of the conditions listed in the HPI is one of convenience and does not necessarily reflect the chronology of the appointment, nor the relative importance of a condition.  · Documentation entered by me for this encounter was done in part using speech-recognition technology. Although I have made an effort to ensure accuracy, "sound like" errors may exist and should be interpreted in context.                        -YVON Galaviz MD       "

## 2018-07-26 NOTE — PATIENT INSTRUCTIONS
Call to schedule your appointment with:    David Vasques, [facility]  [address] [place] [address]  PHONE: [phone]  FAX: [phone]

## 2018-07-27 RX ORDER — BENAZEPRIL HYDROCHLORIDE 5 MG/1
TABLET ORAL
Qty: 30 TABLET | Refills: 1 | OUTPATIENT
Start: 2018-07-27

## 2018-07-31 DIAGNOSIS — E11.42 CONTROLLED TYPE 2 DIABETES MELLITUS WITH DIABETIC POLYNEUROPATHY, WITHOUT LONG-TERM CURRENT USE OF INSULIN: Chronic | ICD-10-CM

## 2018-08-01 ENCOUNTER — PATIENT MESSAGE (OUTPATIENT)
Dept: INTERNAL MEDICINE | Facility: CLINIC | Age: 68
End: 2018-08-01

## 2018-08-01 RX ORDER — SITAGLIPTIN 100 MG/1
TABLET, FILM COATED ORAL
Qty: 90 TABLET | Refills: 0 | Status: SHIPPED | OUTPATIENT
Start: 2018-08-01 | End: 2018-10-18 | Stop reason: SDUPTHER

## 2018-08-03 NOTE — TELEPHONE ENCOUNTER
Per Brooks, pharmacist, they need specific Medicare form completed prior to dispensing patient's lancets and test strips. He informed me the form had been faxed to a different number than Dr. Galaviz's fax number. Provided Dr. Galaviz's fax number ([phone]). Brooks informed me he would fax the form again.

## 2018-08-04 NOTE — ASSESSMENT & PLAN NOTE
He reports compliance with use of CPAP for treatment of obstructive sleep apnea, and he reports perceived therapeutic benefit.

## 2018-08-04 NOTE — ASSESSMENT & PLAN NOTE
Moderate in severity, exacerbated by life stressors. We discussed risks and benefits of treatment options. He reports no history of drug abuse dependency.  [place] [employer] Controlled Prescription Drug Monitoring database was queried and showed no activity to suggest abuse, diversion, or other inappropriate use of prescription medications.

## 2018-08-04 NOTE — ASSESSMENT & PLAN NOTE
--------------------------------------------------------------------------------  RELEVANT DATA REVIEWED:  BP Readings from Last 6 Encounters:   07/26/18 (!) 142/76   05/29/18 108/62   05/08/18 138/80     Wt Readings from Last 2 Encounters:   07/26/18 (!) 141.8 kg (312 lb 9.8 oz)   05/29/18 (!) 140.4 kg (309 lb 8.4 oz)     BMI Readings from Last 2 Encounters:   07/26/18 41.24 kg/m²   05/29/18 40.84 kg/m²     Lab Results   Component Value Date    ESTGFRAFRICA >60.0 05/29/2018    EGFRNONAA >60.0 05/29/2018    CREATININE 0.8 05/29/2018    BUN 15 05/29/2018       The 10-year ASCVD risk score (Ramon PERKINS Jr., et al., 2013) is: 37.1%    Values used to calculate the score:      Age: 67 years      Sex: Male      Is Non- : No      Diabetic: Yes      Tobacco smoker: No      Systolic Blood Pressure: 142 mmHg      Is BP treated: Yes      HDL Cholesterol: 36 mg/dL      Total Cholesterol: 148 mg/dL     This condition appears to be MODESTLY WORSE. Therapeutic lifestyle changes encouraged.   --------------------------------------------------------------------------------

## 2018-08-04 NOTE — ASSESSMENT & PLAN NOTE
No dysuria or hematuria reported. We discussed risks and benefits of treatment options.  He agreed to see urologist for second opinion.

## 2018-08-06 ENCOUNTER — PATIENT MESSAGE (OUTPATIENT)
Dept: INTERNAL MEDICINE | Facility: CLINIC | Age: 68
End: 2018-08-06

## 2018-08-13 ENCOUNTER — PATIENT MESSAGE (OUTPATIENT)
Dept: INTERNAL MEDICINE | Facility: CLINIC | Age: 68
End: 2018-08-13

## 2018-08-17 DIAGNOSIS — I10 BENIGN ESSENTIAL HYPERTENSION: Chronic | ICD-10-CM

## 2018-08-20 RX ORDER — LOSARTAN POTASSIUM 25 MG/1
25 TABLET ORAL DAILY
Qty: 30 TABLET | Refills: 0 | Status: SHIPPED | OUTPATIENT
Start: 2018-08-20 | End: 2018-11-09 | Stop reason: SDUPTHER

## 2018-09-06 ENCOUNTER — OFFICE VISIT (OUTPATIENT)
Dept: INTERNAL MEDICINE | Facility: CLINIC | Age: 68
End: 2018-09-06
Payer: MEDICARE

## 2018-09-06 VITALS
DIASTOLIC BLOOD PRESSURE: 86 MMHG | HEIGHT: 73 IN | HEART RATE: 82 BPM | OXYGEN SATURATION: 98 % | BODY MASS INDEX: 40.65 KG/M2 | SYSTOLIC BLOOD PRESSURE: 118 MMHG | TEMPERATURE: 98 F | WEIGHT: 306.69 LBS

## 2018-09-06 DIAGNOSIS — B35.1 ONYCHOMYCOSIS OF RIGHT GREAT TOE: Primary | ICD-10-CM

## 2018-09-06 DIAGNOSIS — E11.42 CONTROLLED TYPE 2 DIABETES MELLITUS WITH DIABETIC POLYNEUROPATHY, WITHOUT LONG-TERM CURRENT USE OF INSULIN: Chronic | ICD-10-CM

## 2018-09-06 DIAGNOSIS — M19.91 PRIMARY LOCALIZED OSTEOARTHROSIS OF MULTIPLE SITES: Chronic | ICD-10-CM

## 2018-09-06 DIAGNOSIS — R35.1 BENIGN PROSTATIC HYPERPLASIA WITH NOCTURIA: Chronic | ICD-10-CM

## 2018-09-06 DIAGNOSIS — N40.1 BENIGN PROSTATIC HYPERPLASIA WITH NOCTURIA: Chronic | ICD-10-CM

## 2018-09-06 DIAGNOSIS — I10 BENIGN ESSENTIAL HYPERTENSION: Chronic | ICD-10-CM

## 2018-09-06 DIAGNOSIS — C91.51: Chronic | ICD-10-CM

## 2018-09-06 DIAGNOSIS — G47.33 OBSTRUCTIVE SLEEP APNEA TREATED WITH CONTINUOUS POSITIVE AIRWAY PRESSURE (CPAP): Chronic | ICD-10-CM

## 2018-09-06 PROCEDURE — 99214 OFFICE O/P EST MOD 30 MIN: CPT | Mod: S$PBB,,, | Performed by: FAMILY MEDICINE

## 2018-09-06 PROCEDURE — 99999 PR PBB SHADOW E&M-EST. PATIENT-LVL V: CPT | Mod: PBBFAC,,, | Performed by: FAMILY MEDICINE

## 2018-09-06 PROCEDURE — 99215 OFFICE O/P EST HI 40 MIN: CPT | Mod: PBBFAC,PO | Performed by: FAMILY MEDICINE

## 2018-09-06 NOTE — PROGRESS NOTES
CHIEF COMPLAINT  Follow-up      HISTORY OF PRESENT ILLNESS    PROBLEM/CONDITION: He reports that many years ago he had onychomycosis of his right great toe, treated with antifungal therapy successfully, but the problem has recurred over the last year or so. He has heat up yellow hyper trophy right great toe nail. There is no paronychia or other complication. We discussed risks and benefits of treatment options.      PROBLEM/CONDITION: Type 2 diabetes mellitus appears well-controlled. No symptomatic hypoglycemia or hyperglycemia reported.      PROBLEM/CONDITION: Hypertension appears well-controlled. No angina or angina equivalent symptoms reported.      PROBLEM/CONDITION: He is using CPAP for treatment of his obstructive sleep apnea and he reports that he previously was treated with BiPAP. He says he's ONSET with his current sleep clinic and is requesting to transition to our sleep clinic.      PROBLEM/CONDITION: He is following up with his urologist for his BPH, and he reports unsatisfactory symptom control. They are discussing possible transurethral resection.      PROBLEM/CONDITION: He says that his hematologist/oncologist recently gave him a good report regarding his T cell leukemia in remission.      No other complaints or concerns reported.    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)       Endocrine    Controlled type 2 diabetes mellitus with diabetic polyneuropathy, without long-term current use of insulin (Chronic)       Orthopedic    Primary localized osteoarthrosis of multiple sites (Chronic)       Other    Obstructive sleep apnea treated with continuous positive airway pressure (CPAP) (Chronic)    Relevant Orders    Ambulatory consult to Sleep Disorders      Other Visit Diagnoses     Onychomycosis of right  "great toe    -  Primary    Relevant Orders    CULTURE, FUNGUS - SKIN, HAIR, OR NAILS          PAST MEDICAL HISTORY, FAMILY HISTORY and SOCIAL HISTORY, CURRENT MEDICATION LIST, and ALLERGY LIST reviewed by me (YVON Galaviz MD) and are updated consistent with the patient's report.    REVIEW OF SYSTEMS  CONSTITUTIONAL: No fever reported.  CARDIOVASCULAR: No chest pain reported.  PULMONARY: No trouble breathing reported.     PHYSICAL EXAM  Vitals:    09/06/18 1012   BP: 118/86   BP Location: Right arm   Patient Position: Sitting   BP Method: Large (Manual)   Pulse: 82   Temp: 98.1 °F (36.7 °C)   TempSrc: Tympanic   SpO2: 98%   Weight: (!) 139.1 kg (306 lb 10.6 oz)   Height: 6' 1" (1.854 m)     CONSTITUTIONAL: Vital signs noted. No apparent distress. Does not appear acutely ill or septic. Appears adequately hydrated.  HEENT: External ENT grossly unremarkable. Hearing grossly intact. Oropharynx moist.  PULM: Lungs clear. Breathing unlabored.  HEART: Auscultation reveals regular rate and rhythm without murmur, gallop or rub.  DERM: Skin warm and moist with normal turgor.  NEURO: There are no gross focal motor deficits or gross deficits of cranial nerves III-XII.  PSYCHIATRIC: Alert and oriented x 3. Mood is grossly neutral. Affect appropriate. Judgment and insight not grossly compromised.  MUSCULOSKELETAL: Grossly normal stance and gait.     ASSESSMENT and PLAN  Onychomycosis of right great toe  -     Cancel: CULTURE, FUNGUS - SKIN, HAIR, OR NAILS  -     CULTURE, FUNGUS - SKIN, HAIR, OR NAILS; Future; Expected date: 09/06/2018    Obstructive sleep apnea treated with continuous positive airway pressure (CPAP)  -     Ambulatory consult to Sleep Disorders    Benign prostatic hyperplasia with nocturia    Primary localized osteoarthrosis of multiple sites    Controlled type 2 diabetes mellitus with diabetic polyneuropathy, without long-term current use of insulin    Adult T-cell leukemia in remission    Benign essential " "hypertension        PRESCRIPTION MEDICATION MANAGEMENT  Except as noted below, CURRENT MEDICATIONS are to remain unchanged from that listed above.     There are no discontinued medications.    Follow-up for any new complaints or concerns.    Patient Instructions   If you haven't heard from the sleep clinic within 1 week, please call [phone].      ABOUT THIS DOCUMENTATION:  · The order of the conditions listed in the HPI is one of convenience and does not necessarily reflect the chronology of the appointment, nor the relative importance of a condition.  · Documentation entered by me for this encounter was done in part using speech-recognition technology. Although I have made an effort to ensure accuracy, "sound like" errors may exist and should be interpreted in context.                        -YVON Galaviz MD       "

## 2018-09-06 NOTE — PROGRESS NOTES
ATTENTION: change BPH to WITH symptoms    he reports that many years ago he had onychomycosis of his right great toe, treated with antifungal therapy successfully, but the problem has recurred over the last year or so. He has heat up yellow hyper trophy right great toe nail. There is no paronychia or other complication. We discussed risks and benefits of treatment options. Diabetes is good. Hypertension is good. Next problem. He is using CPAP for treatment of his obstructive sleep apnea and he reports that he previously was treated with BiPAP. He says he's onset with his current sleep clinic and is requesting to transition to our sleep clinic. Next problem. He is following up with his urologist for his BPH, and he reports unsatisfactory symptom control. They are discussing possible transurethral resection. Next problem. He says that his hematologist/oncologist recently gave him a good report regarding his T cell leukemia in remission.

## 2018-09-17 PROBLEM — N40.1 BENIGN PROSTATIC HYPERPLASIA WITH NOCTURIA: Chronic | Status: ACTIVE | Noted: 2018-06-05

## 2018-09-17 PROBLEM — R35.1 BENIGN PROSTATIC HYPERPLASIA WITH NOCTURIA: Chronic | Status: ACTIVE | Noted: 2018-06-05

## 2018-10-18 DIAGNOSIS — E11.42 CONTROLLED TYPE 2 DIABETES MELLITUS WITH DIABETIC POLYNEUROPATHY, WITHOUT LONG-TERM CURRENT USE OF INSULIN: Chronic | ICD-10-CM

## 2018-10-18 DIAGNOSIS — F41.1 GENERALIZED ANXIETY DISORDER: Chronic | ICD-10-CM

## 2018-10-18 RX ORDER — CLONAZEPAM 1 MG/1
TABLET ORAL
Qty: 30 TABLET | Refills: 2 | Status: SHIPPED | OUTPATIENT
Start: 2018-10-18 | End: 2019-01-16 | Stop reason: SDUPTHER

## 2018-11-02 ENCOUNTER — OFFICE VISIT (OUTPATIENT)
Dept: INTERNAL MEDICINE | Facility: CLINIC | Age: 68
End: 2018-11-02
Payer: MEDICARE

## 2018-11-02 VITALS
DIASTOLIC BLOOD PRESSURE: 62 MMHG | HEIGHT: 73 IN | BODY MASS INDEX: 41.75 KG/M2 | TEMPERATURE: 98 F | OXYGEN SATURATION: 97 % | SYSTOLIC BLOOD PRESSURE: 132 MMHG | WEIGHT: 315 LBS | HEART RATE: 72 BPM

## 2018-11-02 DIAGNOSIS — E78.2 DYSLIPIDEMIA WITH LOW HIGH DENSITY LIPOPROTEIN (HDL) CHOLESTEROL WITH HYPERTRIGLYCERIDEMIA DUE TO TYPE 2 DIABETES MELLITUS: ICD-10-CM

## 2018-11-02 DIAGNOSIS — Z11.59 ENCOUNTER FOR HEPATITIS C SCREENING TEST FOR LOW RISK PATIENT: ICD-10-CM

## 2018-11-02 DIAGNOSIS — H00.015 HORDEOLUM EXTERNUM OF LEFT LOWER EYELID: ICD-10-CM

## 2018-11-02 DIAGNOSIS — E66.01 MORBID OBESITY WITH BMI OF 40.0-44.9, ADULT: Chronic | ICD-10-CM

## 2018-11-02 DIAGNOSIS — R35.1 BENIGN PROSTATIC HYPERPLASIA WITH NOCTURIA: Chronic | ICD-10-CM

## 2018-11-02 DIAGNOSIS — Z01.818 PREOP EXAMINATION: Primary | ICD-10-CM

## 2018-11-02 DIAGNOSIS — E11.69 DYSLIPIDEMIA WITH LOW HIGH DENSITY LIPOPROTEIN (HDL) CHOLESTEROL WITH HYPERTRIGLYCERIDEMIA DUE TO TYPE 2 DIABETES MELLITUS: ICD-10-CM

## 2018-11-02 DIAGNOSIS — I10 BENIGN ESSENTIAL HYPERTENSION: Chronic | ICD-10-CM

## 2018-11-02 DIAGNOSIS — N40.1 BENIGN PROSTATIC HYPERPLASIA WITH NOCTURIA: Chronic | ICD-10-CM

## 2018-11-02 DIAGNOSIS — F41.1 GENERALIZED ANXIETY DISORDER: Chronic | ICD-10-CM

## 2018-11-02 DIAGNOSIS — R79.89 LOW TESTOSTERONE: ICD-10-CM

## 2018-11-02 DIAGNOSIS — G47.33 OBSTRUCTIVE SLEEP APNEA TREATED WITH CONTINUOUS POSITIVE AIRWAY PRESSURE (CPAP): Chronic | ICD-10-CM

## 2018-11-02 DIAGNOSIS — E11.42 CONTROLLED TYPE 2 DIABETES MELLITUS WITH DIABETIC POLYNEUROPATHY, WITHOUT LONG-TERM CURRENT USE OF INSULIN: Chronic | ICD-10-CM

## 2018-11-02 PROCEDURE — 99215 OFFICE O/P EST HI 40 MIN: CPT | Mod: PBBFAC,PO | Performed by: FAMILY MEDICINE

## 2018-11-02 PROCEDURE — 99999 PR PBB SHADOW E&M-EST. PATIENT-LVL V: CPT | Mod: PBBFAC,,, | Performed by: FAMILY MEDICINE

## 2018-11-02 PROCEDURE — 99214 OFFICE O/P EST MOD 30 MIN: CPT | Mod: S$PBB,,, | Performed by: FAMILY MEDICINE

## 2018-11-02 PROCEDURE — 90662 IIV NO PRSV INCREASED AG IM: CPT | Mod: PBBFAC,PO

## 2018-11-02 RX ORDER — ATORVASTATIN CALCIUM 20 MG/1
20 TABLET, FILM COATED ORAL DAILY
Qty: 90 TABLET | Refills: 3 | Status: SHIPPED | OUTPATIENT
Start: 2018-11-02 | End: 2019-02-01

## 2018-11-02 RX ORDER — BACITRACIN 500 [USP'U]/G
OINTMENT OPHTHALMIC EVERY 4 HOURS
Qty: 3.5 G | Refills: 0 | Status: SHIPPED | OUTPATIENT
Start: 2018-11-02 | End: 2018-11-09

## 2018-11-02 NOTE — PROGRESS NOTES
COLORECTAL CANCER SCREENING  DONE - He reports having colonoscopy done about 7 years ago (date) at [facility] (location). We are requesting records.    FLU VACCINE  He agreed to get flu shot today.    DIABETIC EYE EXAM  DONE - He reports having diabetic eye exam done in May-June, 2018 (date) at optometrist Dr. Best (location). We are requesting records.

## 2018-11-02 NOTE — ASSESSMENT & PLAN NOTE
Lab Results   Component Value Date    HGBA1C 6.3 (H) 05/29/2018    ESTGFRAFRICA >60.0 05/29/2018    EGFRNONAA >60.0 05/29/2018     BP Readings from Last 1 Encounters:   11/02/18 132/62     Wt Readings from Last 2 Encounters:   11/02/18 (!) 144.6 kg (318 lb 12.6 oz)   09/06/18 (!) 139.1 kg (306 lb 10.6 oz)     BMI Readings from Last 2 Encounters:   11/02/18 42.06 kg/m²   09/06/18 40.46 kg/m²       HEALTH MAINTENANCE: Diabetic health maintenance interventions reviewed and are up to date except for:      Topic    Eye Exam      DIABETIC FOOT EXAM: Inspection of feet reveals no open wounds, ulcerations, significant calluses, or evidence of arterial insufficiency. 10g monofilament sensation is intact in all 5 locations tested.

## 2018-11-02 NOTE — PROGRESS NOTES
REPORT OF PHYSICIAN CONSULTATION FOR PRE-OPERATIVE EVALUATION  The final report and any associated test results will be sent to the consulting provider by fax and/or by US Mail.    CONSULTATION REQUESTED BY: Dr. Quinton Jeffrey, [facility]  REASON FOR CONSULTATION (CHIEF COMPLAINT): Pre-operative evaluation, history and physical exam  ANTICIPATED OPERATION: left shoulder arthroscopy with possible rotator cuff repair  ANTICIPATED DATE OF OPERATION: within 2-3 weeks  ANESTHESIA ANTICIPATED TO BE USED: general    PREOPERATIVE TESTING ORDERED: complete blood count and metabolic panel    PERIOPERATIVE RISK ASSESSMENT AND RECOMMENDATION: Based on the history and physical exam I performed and review of the data presently available to me, Robin may proceed with the planned operation without further evaluation or delay CONDITIONAL UPON review of the results of PREOPERATIVE  TESTING ORDERED.    COMMENT: He reports that he saw his cardiologist yesterday and had preoperative cardiac clearance. I defer recommendations for perioperative aspirin management to his cardiologist.    Thank you for letting me care for your patient. Please call me with any questions.     YVON Galaviz MD              ADDITIONAL HISTORY    ANESTHESIA HISTORY: No history of adverse reaction to anesthesia.    FUNCTIONAL AEROBIC CAPACITY REPORTED BY PATIENT: Equal to or greater than 5 METs    HEMATOLOGIC/HEMOSTATIC CONCERN : No history of hematologic disease or bleeding problems reported.    THROMBOEMBOLISM RISK: No history of thromboembolic events or hypercoagulable state.      ACTIVE PROBLEM LIST     Morbid obesity with BMI of 40.0-44.9, adult   ASSESSMENT: This problem appears worse, exacerbated by limited physical activity.   -     Comprehensive metabolic panel; Future; Expected date: 11/02/2018      Obstructive sleep apnea treated with continuous positive airway pressure (CPAP)   ASSESSMENT: He reports compliance with use of his  CPAP. He reports perceived therapeutic benefit.      Controlled type 2 diabetes mellitus with diabetic polyneuropathy, without long-term current use of insulin  ASSESSMENT: This problem appears controlled based on his report of home glycemic monitoring and his most recent hemoglobin A1c.   -     Comprehensive metabolic panel; Future; Expected date: 11/02/2018   -     Hemoglobin A1c; Future; Expected date: 11/02/2018   -      DIABETES FOOT EXAM      Low testosterone   ASSESSMENT: He reports having been treated with IM testosterone for low testosterone in the past. He has not been treated within the last several years. He is requesting repeat testosterone measurement. He reports symptoms including fatigue, erectile dysfunction, and low libido.   -     Testosterone; Future; Expected date: 11/02/2018      Benign prostatic hyperplasia with nocturia   ASSESSMENT: This problem appears stable/controlled. He has follow-up appointment with his urologist later today.      Benign essential hypertension   ASSESSMENT: This problem is well-controlled.   -     Comprehensive metabolic panel; Future; Expected date: 11/02/2018      Generalized anxiety disorder   ASSESSMENT: This problem is much improved, adequately controlled.     Dyslipidemia with low high density lipoprotein (HDL) cholesterol with hypertriglyceridemia due to type 2 diabetes mellitus   ASSESSMENT: We reviewed his cardiovascular risks and discussed treatment options. In addition to therapeutic lifestyle changes, he is going to begin atorvastatin. He has no known contraindications to statin therapy.  -     Comprehensive metabolic panel; Future; Expected date: 11/02/2018   -     atorvastatin (LIPITOR) 20 MG tablet; Take 1 tablet (20 mg total) by mouth once daily.  Dispense: 90 tablet; Refill: 3    Hordeolum externum of left lower eyelid  ASSESSMENT: He has inflamed but uncomplicated appearing stye of his left lower eyelid, onset over the last few days.   -      bacitracin (AK-TRACIN) ophthalmic ointment; Place into the left eye every 4 (four) hours. for 7 days  Dispense: 3.5 g; Refill: 0        Problem List Items Addressed This Visit        Psychiatric    Generalized anxiety disorder (Chronic)       Cardiac/Vascular    Dyslipidemia with low high density lipoprotein (HDL) cholesterol with hypertriglyceridemia due to type 2 diabetes mellitus    Relevant Medications    atorvastatin (LIPITOR) 20 MG tablet    Other Relevant Orders    Comprehensive metabolic panel    Benign essential hypertension (Chronic)    Relevant Orders    Comprehensive metabolic panel       Renal/    Benign prostatic hyperplasia with nocturia (Chronic)       Endocrine    Low testosterone    Relevant Orders    Testosterone    Controlled type 2 diabetes mellitus with diabetic polyneuropathy, without long-term current use of insulin (Chronic)    Current Assessment & Plan     Lab Results   Component Value Date    HGBA1C 6.3 (H) 05/29/2018    ESTGFRAFRICA >60.0 05/29/2018    EGFRNONAA >60.0 05/29/2018     BP Readings from Last 1 Encounters:   11/02/18 132/62     Wt Readings from Last 2 Encounters:   11/02/18 (!) 144.6 kg (318 lb 12.6 oz)   09/06/18 (!) 139.1 kg (306 lb 10.6 oz)     BMI Readings from Last 2 Encounters:   11/02/18 42.06 kg/m²   09/06/18 40.46 kg/m²       HEALTH MAINTENANCE: Diabetic health maintenance interventions reviewed and are up to date except for:      Topic    Eye Exam      DIABETIC FOOT EXAM: Inspection of feet reveals no open wounds, ulcerations, significant calluses, or evidence of arterial insufficiency. 10g monofilament sensation is intact in all 5 locations tested.          Relevant Orders    Comprehensive metabolic panel    Hemoglobin A1c    HM DIABETES FOOT EXAM (Completed)    Morbid obesity with BMI of 40.0-44.9, adult (Chronic)    Relevant Orders    Comprehensive metabolic panel       Other    Obstructive sleep apnea treated with continuous positive airway pressure (CPAP)  (Chronic)      Other Visit Diagnoses     Preop examination    -  Primary    Relevant Orders    CBC auto differential    Encounter for hepatitis C screening test for low risk patient        Relevant Orders    Hepatitis C antibody    Hordeolum externum of left lower eyelid        Relevant Medications    bacitracin (AK-TRACIN) ophthalmic ointment            MEDICAL HISTORY REVIEWED AND UPDATED  He has a past medical history of Adult T-cell leukemia in remission, B12 deficiency, Benign essential hypertension, Benign prostatic hyperplasia with nocturia, Chronic pain (degenerative spine and joints, multiple sites), Class 3 severe obesity due to excess calories with serious comorbidity and body mass index (BMI) of 40.0 to 44.9 in adult, GERD (gastroesophageal reflux disease), Hyperlipidemia, Hypogonadism in male, Lumbar disc disease, Morbid obesity with BMI of 40.0-44.9, adult, OA (osteoarthritis of spine), Obesity, Obstructive sleep apnea treated with continuous positive airway pressure (CPAP), PAF (paroxysmal atrial fibrillation), Pancytopenia, Seasonal allergic rhinitis due to pollen, and Venous stasis dermatitis of both lower extremities.      SURGICAL HISTORY REVIEWED AND UPDATED  He has no past surgical history on file.      CURRENT MEDICATION LIST REVIEWED AND UPDATED  Current Outpatient Medications   Medication Sig    aspirin 81 MG Chew Take 81 mg by mouth.    atorvastatin (LIPITOR) 20 MG tablet Take 1 tablet (20 mg total) by mouth once daily.    bacitracin (AK-TRACIN) ophthalmic ointment Place into the left eye every 4 (four) hours. for 7 days    blood sugar diagnostic Strp Check blood glucose 1 times daily as directed and as needed (dispense insurance preferred brand or patient choice)    blood-glucose meter kit Check blood glucose 1 times daily as directed and as needed (dispense insurance preferred brand or patient choice    clonazePAM (KLONOPIN) 1 MG tablet take 1 tablet by mouth every evening     desoximetasone (TOPICORT) 0.25 % cream Apply to affected area daily for 2 weeks    fluticasone (FLONASE) 50 mcg/actuation nasal spray 1 spray by Nasal route.    glimepiride (AMARYL) 2 MG tablet take 1 tablet by mouth every morning    hydrocodone-acetaminophen 10-325mg (NORCO)  mg Tab Take 1 tablet by mouth every 6 (six) hours as needed.    ipratropium (ATROVENT) 42 mcg (0.06 %) nasal spray instill 2 sprays into each nostril three times a day    ketoconazole (NIZORAL) 2 % cream Apply bid    lancets Misc Check blood glucose 1 times daily as directed and as needed (dispense insurance preferred brand or patient choice)    losartan (COZAAR) 25 MG tablet Take 1 tablet (25 mg total) by mouth once daily. - We'll discuss more refills at your next appointment 09/06/2018 at 10:00 AM.    montelukast (SINGULAIR) 10 mg tablet Take 10 mg by mouth.    multivit-min-FA-lycopen-lutein 300-600-300 mcg Tab Take by mouth.    SITagliptin (JANUVIA) 100 MG Tab Take 1 tablet (100 mg total) by mouth once daily.    tamsulosin (FLOMAX) 0.4 mg Cp24 take 1 capsules by mouth once daily    traMADol (ULTRAM) 50 mg tablet 1-2 tabs po Q4 hr prn pain     No current facility-administered medications for this visit.        ANTICOAGULATION: Robin is NOT on anticoagulation, including warfarin, heparin, apixaban, argatroban, bivalirudin, dabigatran, dalteparin, desirudin, edoxaban, enoxaparin, fondaparinux, fondaparinux, rivaroxaban, or any other known anticoagulant.  ASPIRIN: Robin IS on aspirin. (I defer aspirin management to his cardiologist)  CLOPIDOGREL: Robin is NOT on clopidogrel.      ALLERGY LIST REVIEWED AND UPDATED  Review of patient's allergies indicates:   Allergen Reactions    Levofloxacin     Sulfa (sulfonamide antibiotics)     Adhesive Rash         SOCIAL HISTORY REVIEWED AND UPDATED  TOBACCO USE HISTORY: He reports that  has never smoked. he has never used smokeless tobacco.  ALCOHOL USE HISTORY:  He reports that he  "drinks alcohol.  RECREATIONAL DRUG USE HISTORY:  He reports that he does not use drugs.      REVIEW OF SYSTEMS  CONSTITUTIONAL: No fever reported.  CARDIOVASCULAR: No angina reported.  PULMONARY: No hemoptysis or trouble breathing reported.  PSYCHIATRIC: No mood instability reported.  NEUROLOGIC: No seizures reported.  ENDOCRINE: No polydipsia reported.  GASTROINTESTINAL: No blood in stool reported.  GENITOURINARY: No hematuria reported.  ENT: No earache or sore throat reported.  OPHTHALMOLOGIC: No acute vision changes reported.  HEMATOLOGIC: No bleeding problems reported.  MUSCULOSKELETAL: No recent injuries reported.  DERMATOLOGIC: No new rash reported.  REMAINDER OF COMPLETE REVIEW OF SYSTEMS is negative or noncontributory to present illness except as noted herein.     PHYSICAL EXAM  Vitals:    11/02/18 0918   BP: 132/62   BP Location: Right arm   Patient Position: Sitting   BP Method: Large (Manual)   Pulse: 72   Temp: 97.9 °F (36.6 °C)   TempSrc: Tympanic   SpO2: 97%   Weight: (!) 144.6 kg (318 lb 12.6 oz)   Height: 6' 1" (1.854 m)     CONSTITUTIONAL: Vital signs noted. No apparent distress. Does not appear acutely ill or septic. Appears adequately hydrated.  EYE: Pupils equal and reactive. Extraocular movements intact. Sclerae anicteric. Stye left lower eyelid. Lids and conjunctiva otherwise unremarkable.  ENT: External ENT unremarkable. Oropharynx moist without lesion, exudate or inflammation.  Lips and tongue unremarkable. Nasal mucosa pink.  NECK: Trachea midline.  PULM: Lungs clear. Breathing unlabored.  CV: Auscultation reveals regular rate and rhythm without murmur, gallop or rub. No carotid bruit.   GI: Abdomen soft and nontender. Bowel sounds present.  DERM: Skin warm and moist with normal turgor.  NEURO: No gross focal motor deficits or gross deficits of cranial nerves 3-12.  PSYCH: Alert and oriented x 3. Mood is grossly neutral. Affect appropriate. Judgment and insight not " grossly compromised.  MSK: Grossly normal stance and gait.    ASSESSMENT and PLAN  Preop examination  -     CBC auto differential; Future; Expected date: 11/02/2018    Morbid obesity with BMI of 40.0-44.9, adult  -     Comprehensive metabolic panel; Future; Expected date: 11/02/2018    Obstructive sleep apnea treated with continuous positive airway pressure (CPAP)    Controlled type 2 diabetes mellitus with diabetic polyneuropathy, without long-term current use of insulin  -     Comprehensive metabolic panel; Future; Expected date: 11/02/2018  -     Hemoglobin A1c; Future; Expected date: 11/02/2018  -     HM DIABETES FOOT EXAM    Benign prostatic hyperplasia with nocturia    Benign essential hypertension  -     Comprehensive metabolic panel; Future; Expected date: 11/02/2018    Generalized anxiety disorder    Dyslipidemia with low high density lipoprotein (HDL) cholesterol with hypertriglyceridemia due to type 2 diabetes mellitus  -     Comprehensive metabolic panel; Future; Expected date: 11/02/2018  -     atorvastatin (LIPITOR) 20 MG tablet; Take 1 tablet (20 mg total) by mouth once daily.  Dispense: 90 tablet; Refill: 3    Encounter for hepatitis C screening test for low risk patient  -     Hepatitis C antibody; Future; Expected date: 11/02/2018    Low testosterone  -     Testosterone; Future; Expected date: 11/02/2018    Hordeolum externum of left lower eyelid  -     bacitracin (AK-TRACIN) ophthalmic ointment; Place into the left eye every 4 (four) hours. for 7 days  Dispense: 3.5 g; Refill: 0    Other orders  -     Influenza - High Dose (65+) (PF) (IM)           Medication List           Accurate as of 11/2/18 10:01 AM. If you have any questions, ask your nurse or doctor.               START taking these medications    atorvastatin 20 MG tablet  Commonly known as:  LIPITOR  Take 1 tablet (20 mg total) by mouth once daily.  Started by:  BIANCA Galaviz MD     bacitracin ophthalmic ointment  Commonly known as:   AK-TRACIN  Place into the left eye every 4 (four) hours. for 7 days  Started by:  BIANCA Galaviz MD        CONTINUE taking these medications    aspirin 81 MG Chew     blood sugar diagnostic Strp  Check blood glucose 1 times daily as directed and as needed (dispense insurance preferred brand or patient choice)     blood-glucose meter kit  Check blood glucose 1 times daily as directed and as needed (dispense insurance preferred brand or patient choice     clonazePAM 1 MG tablet  Commonly known as:  KLONOPIN  take 1 tablet by mouth every evening     desoximetasone 0.25 % cream  Commonly known as:  TOPICORT     fluticasone 50 mcg/actuation nasal spray  Commonly known as:  FLONASE     glimepiride 2 MG tablet  Commonly known as:  AMARYL  take 1 tablet by mouth every morning     HYDROcodone-acetaminophen  mg per tablet  Commonly known as:  NORCO     ipratropium 42 mcg (0.06 %) nasal spray  Commonly known as:  ATROVENT     ketoconazole 2 % cream  Commonly known as:  NIZORAL     lancets Misc  Check blood glucose 1 times daily as directed and as needed (dispense insurance preferred brand or patient choice)     losartan 25 MG tablet  Commonly known as:  COZAAR  Take 1 tablet (25 mg total) by mouth once daily. - We'll discuss more refills at your next appointment 09/06/2018 at 10:00 AM.     montelukast 10 mg tablet  Commonly known as:  SINGULAIR     multivit-min-FA-lycopen-lutein 300-600-300 mcg Tab     SITagliptin 100 MG Tab  Commonly known as:  JANUVIA  Take 1 tablet (100 mg total) by mouth once daily.     tamsulosin 0.4 mg Cap  Commonly known as:  FLOMAX  take 1 capsules by mouth once daily     traMADol 50 mg tablet  Commonly known as:  ULTRAM           Where to Get Your Medications      These medications were sent to [id number] - [place] - [address] AT [facility] [address]  [address] [place] [address]    Phone:  [phone]   · atorvastatin 20 MG  tablet  · bacitracin ophthalmic ointment         FOLLOW-UP: As needed and for routine health maintenance.    ABOUT THIS DOCUMENTATION:  1. Documentation entered by me for this encounter was done in part using speech-recognition technology. Although I have made an effort to ensure accuracy, malapropisms may exist and should be interpreted in context.                        HAILEY Galaviz MD

## 2018-11-06 ENCOUNTER — LAB VISIT (OUTPATIENT)
Dept: LAB | Facility: HOSPITAL | Age: 68
End: 2018-11-06
Attending: FAMILY MEDICINE
Payer: MEDICARE

## 2018-11-06 DIAGNOSIS — E11.69 DYSLIPIDEMIA WITH LOW HIGH DENSITY LIPOPROTEIN (HDL) CHOLESTEROL WITH HYPERTRIGLYCERIDEMIA DUE TO TYPE 2 DIABETES MELLITUS: ICD-10-CM

## 2018-11-06 DIAGNOSIS — R79.89 LOW TESTOSTERONE: ICD-10-CM

## 2018-11-06 DIAGNOSIS — Z11.59 ENCOUNTER FOR HEPATITIS C SCREENING TEST FOR LOW RISK PATIENT: ICD-10-CM

## 2018-11-06 DIAGNOSIS — E11.42 CONTROLLED TYPE 2 DIABETES MELLITUS WITH DIABETIC POLYNEUROPATHY, WITHOUT LONG-TERM CURRENT USE OF INSULIN: Chronic | ICD-10-CM

## 2018-11-06 DIAGNOSIS — E66.01 MORBID OBESITY WITH BMI OF 40.0-44.9, ADULT: Chronic | ICD-10-CM

## 2018-11-06 DIAGNOSIS — I10 BENIGN ESSENTIAL HYPERTENSION: Chronic | ICD-10-CM

## 2018-11-06 DIAGNOSIS — E78.2 DYSLIPIDEMIA WITH LOW HIGH DENSITY LIPOPROTEIN (HDL) CHOLESTEROL WITH HYPERTRIGLYCERIDEMIA DUE TO TYPE 2 DIABETES MELLITUS: ICD-10-CM

## 2018-11-06 DIAGNOSIS — Z01.818 PREOP EXAMINATION: ICD-10-CM

## 2018-11-06 LAB
ALBUMIN SERPL BCP-MCNC: 3.5 G/DL
ALP SERPL-CCNC: 80 U/L
ALT SERPL W/O P-5'-P-CCNC: 18 U/L
ANION GAP SERPL CALC-SCNC: 7 MMOL/L
AST SERPL-CCNC: 18 U/L
BASOPHILS # BLD AUTO: 0.01 K/UL
BASOPHILS NFR BLD: 0.4 %
BILIRUB SERPL-MCNC: 1.3 MG/DL
BUN SERPL-MCNC: 14 MG/DL
CALCIUM SERPL-MCNC: 8.9 MG/DL
CHLORIDE SERPL-SCNC: 107 MMOL/L
CO2 SERPL-SCNC: 23 MMOL/L
CREAT SERPL-MCNC: 0.8 MG/DL
DIFFERENTIAL METHOD: ABNORMAL
EOSINOPHIL # BLD AUTO: 0 K/UL
EOSINOPHIL NFR BLD: 1.7 %
ERYTHROCYTE [DISTWIDTH] IN BLOOD BY AUTOMATED COUNT: 14.4 %
EST. GFR  (AFRICAN AMERICAN): >60 ML/MIN/1.73 M^2
EST. GFR  (NON AFRICAN AMERICAN): >60 ML/MIN/1.73 M^2
ESTIMATED AVG GLUCOSE: 148 MG/DL
GLUCOSE SERPL-MCNC: 248 MG/DL
HBA1C MFR BLD HPLC: 6.8 %
HCT VFR BLD AUTO: 34.5 %
HGB BLD-MCNC: 11.6 G/DL
IMM GRANULOCYTES # BLD AUTO: 0.01 K/UL
IMM GRANULOCYTES NFR BLD AUTO: 0.4 %
LYMPHOCYTES # BLD AUTO: 0.8 K/UL
LYMPHOCYTES NFR BLD: 33.6 %
MCH RBC QN AUTO: 31.9 PG
MCHC RBC AUTO-ENTMCNC: 33.6 G/DL
MCV RBC AUTO: 95 FL
MONOCYTES # BLD AUTO: 0.2 K/UL
MONOCYTES NFR BLD: 10.3 %
NEUTROPHILS # BLD AUTO: 1.2 K/UL
NEUTROPHILS NFR BLD: 53.6 %
NRBC BLD-RTO: 0 /100 WBC
PLATELET # BLD AUTO: 152 K/UL
PMV BLD AUTO: 9.9 FL
POTASSIUM SERPL-SCNC: 4.4 MMOL/L
PROT SERPL-MCNC: 7 G/DL
RBC # BLD AUTO: 3.64 M/UL
SODIUM SERPL-SCNC: 137 MMOL/L
TESTOST SERPL-MCNC: 321 NG/DL
WBC # BLD AUTO: 2.32 K/UL

## 2018-11-06 PROCEDURE — 85025 COMPLETE CBC W/AUTO DIFF WBC: CPT

## 2018-11-06 PROCEDURE — 84403 ASSAY OF TOTAL TESTOSTERONE: CPT

## 2018-11-06 PROCEDURE — 83036 HEMOGLOBIN GLYCOSYLATED A1C: CPT

## 2018-11-06 PROCEDURE — 36415 COLL VENOUS BLD VENIPUNCTURE: CPT | Mod: PO

## 2018-11-06 PROCEDURE — 80053 COMPREHEN METABOLIC PANEL: CPT

## 2018-11-06 PROCEDURE — 86803 HEPATITIS C AB TEST: CPT

## 2018-11-07 LAB — HCV AB SERPL QL IA: NEGATIVE

## 2018-11-09 ENCOUNTER — OFFICE VISIT (OUTPATIENT)
Dept: INTERNAL MEDICINE | Facility: CLINIC | Age: 68
End: 2018-11-09
Payer: MEDICARE

## 2018-11-09 VITALS
TEMPERATURE: 98 F | BODY MASS INDEX: 41.75 KG/M2 | DIASTOLIC BLOOD PRESSURE: 70 MMHG | HEIGHT: 73 IN | WEIGHT: 315 LBS | OXYGEN SATURATION: 96 % | SYSTOLIC BLOOD PRESSURE: 126 MMHG | HEART RATE: 65 BPM

## 2018-11-09 DIAGNOSIS — E78.2 DYSLIPIDEMIA WITH LOW HIGH DENSITY LIPOPROTEIN (HDL) CHOLESTEROL WITH HYPERTRIGLYCERIDEMIA DUE TO TYPE 2 DIABETES MELLITUS: ICD-10-CM

## 2018-11-09 DIAGNOSIS — I10 BENIGN ESSENTIAL HYPERTENSION: Chronic | ICD-10-CM

## 2018-11-09 DIAGNOSIS — E11.69 DYSLIPIDEMIA WITH LOW HIGH DENSITY LIPOPROTEIN (HDL) CHOLESTEROL WITH HYPERTRIGLYCERIDEMIA DUE TO TYPE 2 DIABETES MELLITUS: ICD-10-CM

## 2018-11-09 DIAGNOSIS — N40.0 BENIGN PROSTATIC HYPERPLASIA WITHOUT LOWER URINARY TRACT SYMPTOMS: Chronic | ICD-10-CM

## 2018-11-09 DIAGNOSIS — C91.51: Primary | Chronic | ICD-10-CM

## 2018-11-09 DIAGNOSIS — D70.9 GRANULOCYTOPENIA: Chronic | ICD-10-CM

## 2018-11-09 DIAGNOSIS — E11.42 CONTROLLED TYPE 2 DIABETES MELLITUS WITH DIABETIC POLYNEUROPATHY, WITHOUT LONG-TERM CURRENT USE OF INSULIN: Chronic | ICD-10-CM

## 2018-11-09 PROCEDURE — 99999 PR PBB SHADOW E&M-EST. PATIENT-LVL IV: CPT | Mod: PBBFAC,,, | Performed by: FAMILY MEDICINE

## 2018-11-09 PROCEDURE — 99214 OFFICE O/P EST MOD 30 MIN: CPT | Mod: PBBFAC,PO | Performed by: FAMILY MEDICINE

## 2018-11-09 PROCEDURE — 99214 OFFICE O/P EST MOD 30 MIN: CPT | Mod: S$PBB,,, | Performed by: FAMILY MEDICINE

## 2018-11-09 RX ORDER — LOSARTAN POTASSIUM 25 MG/1
25 TABLET ORAL DAILY
Qty: 90 TABLET | Refills: 3 | Status: SHIPPED | OUTPATIENT
Start: 2018-11-09 | End: 2019-09-07 | Stop reason: SDUPTHER

## 2018-11-09 RX ORDER — TAMSULOSIN HYDROCHLORIDE 0.4 MG/1
0.4 CAPSULE ORAL DAILY
Qty: 90 CAPSULE | Refills: 3 | Status: SHIPPED | OUTPATIENT
Start: 2018-11-09

## 2018-11-09 NOTE — PROGRESS NOTES
CHIEF COMPLAINT  Diabetes      HISTORY OF PRESENT ILLNESS  PROBLEM/CONDITION:  Type 2 diabetes mellitus appears well-controlled. No symptomatic hypoglycemia or hyperglycemia reported.    PROBLEM/CONDITION: Hypertension appears well-controlled. No angina or angina equivalent symptoms reported.     PROBLEM/CONDITION: BPH symptoms are controls to his satisfaction on tamsulosin.    PROBLEM/CONDITION: Granulocytopenia noted on recent labs. He is going to address this with his hematologist/oncologist when he follows up for his T cell leukemia.    Problem List Items Addressed This Visit        Cardiac/Vascular    Dyslipidemia with low high density lipoprotein (HDL) cholesterol with hypertriglyceridemia due to type 2 diabetes mellitus    Benign essential hypertension (Chronic)    Relevant Medications    losartan (COZAAR) 25 MG tablet       Oncology    Adult T-cell leukemia in remission - Primary (Chronic)    Overview     HEMATOLOGIST: Dr. Brown         Granulocytopenia (Chronic)       Endocrine    Controlled type 2 diabetes mellitus with diabetic polyneuropathy, without long-term current use of insulin (Chronic)    Relevant Orders    Hemoglobin A1c      Other Visit Diagnoses     Benign prostatic hyperplasia without lower urinary tract symptoms  (Chronic)       Relevant Medications    tamsulosin (FLOMAX) 0.4 mg Cap          Lab Visit on 11/06/2018   Component Date Value Ref Range Status    Sodium 11/06/2018 137  136 - 145 mmol/L Final    Potassium 11/06/2018 4.4  3.5 - 5.1 mmol/L Final    Chloride 11/06/2018 107  95 - 110 mmol/L Final    CO2 11/06/2018 23  23 - 29 mmol/L Final    Glucose 11/06/2018 248* 70 - 110 mg/dL Final    BUN, Bld 11/06/2018 14  8 - 23 mg/dL Final    Creatinine 11/06/2018 0.8  0.5 - 1.4 mg/dL Final    Calcium 11/06/2018 8.9  8.7 - 10.5 mg/dL Final    Total Protein 11/06/2018 7.0  6.0 - 8.4 g/dL Final    Albumin 11/06/2018 3.5  3.5 - 5.2 g/dL Final    Total Bilirubin 11/06/2018 1.3* 0.1 -  1.0 mg/dL Final    Comment: For infants and newborns, interpretation of results should be based  on gestational age, weight and in agreement with clinical  observations.  Premature Infant recommended reference ranges:  Up to 24 hours.............<8.0 mg/dL  Up to 48 hours............<12.0 mg/dL  3-5 days..................<15.0 mg/dL  6-29 days.................<15.0 mg/dL      Alkaline Phosphatase 11/06/2018 80  55 - 135 U/L Final    AST 11/06/2018 18  10 - 40 U/L Final    ALT 11/06/2018 18  10 - 44 U/L Final    Anion Gap 11/06/2018 7* 8 - 16 mmol/L Final    eGFR if African American 11/06/2018 >60.0  >60 mL/min/1.73 m^2 Final    eGFR if non African American 11/06/2018 >60.0  >60 mL/min/1.73 m^2 Final    Comment: Calculation used to obtain the estimated glomerular filtration  rate (eGFR) is the CKD-EPI equation.       Hemoglobin A1C 11/06/2018 6.8* 4.0 - 5.6 % Final    Comment: ADA Screening Guidelines:  5.7-6.4%  Consistent with prediabetes  >or=6.5%  Consistent with diabetes  High levels of fetal hemoglobin interfere with the HbA1C  assay. Heterozygous hemoglobin variants (HbS, HgC, etc)do  not significantly interfere with this assay.   However, presence of multiple variants may affect accuracy.      Estimated Avg Glucose 11/06/2018 148* 68 - 131 mg/dL Final    Hepatitis C Ab 11/06/2018 Negative   Final    WBC 11/06/2018 2.32* 3.90 - 12.70 K/uL Final    RBC 11/06/2018 3.64* 4.60 - 6.20 M/uL Final    Hemoglobin 11/06/2018 11.6* 14.0 - 18.0 g/dL Final    Hematocrit 11/06/2018 34.5* 40.0 - 54.0 % Final    MCV 11/06/2018 95  82 - 98 fL Final    MCH 11/06/2018 31.9* 27.0 - 31.0 pg Final    MCHC 11/06/2018 33.6  32.0 - 36.0 g/dL Final    RDW 11/06/2018 14.4  11.5 - 14.5 % Final    Platelets 11/06/2018 152  150 - 350 K/uL Final    MPV 11/06/2018 9.9  9.2 - 12.9 fL Final    Immature Granulocytes 11/06/2018 0.4  0.0 - 0.5 % Final    Gran # (ANC) 11/06/2018 1.2* 1.8 - 7.7 K/uL Final    Immature Grans  "(Abs) 11/06/2018 0.01  0.00 - 0.04 K/uL Final    Comment: Mild elevation in immature granulocytes is non specific and   can be seen in a variety of conditions including stress response,   acute inflammation, trauma and pregnancy. Correlation with other   laboratory and clinical findings is essential.      Lymph # 11/06/2018 0.8* 1.0 - 4.8 K/uL Final    Mono # 11/06/2018 0.2* 0.3 - 1.0 K/uL Final    Eos # 11/06/2018 0.0  0.0 - 0.5 K/uL Final    Baso # 11/06/2018 0.01  0.00 - 0.20 K/uL Final    nRBC 11/06/2018 0  0 /100 WBC Final    Gran% 11/06/2018 53.6  38.0 - 73.0 % Final    Lymph% 11/06/2018 33.6  18.0 - 48.0 % Final    Mono% 11/06/2018 10.3  4.0 - 15.0 % Final    Eosinophil% 11/06/2018 1.7  0.0 - 8.0 % Final    Basophil% 11/06/2018 0.4  0.0 - 1.9 % Final    Differential Method 11/06/2018 Automated   Final    Testosterone, Total 11/06/2018 321  304 - 1227 ng/dL Final   Lab Visit on 11/06/2018   Component Date Value Ref Range Status    Fungus Cult, skin, hair or nails 11/06/2018 TRICHOPHYTON RUBRUM   Preliminary        PAST MEDICAL HISTORY, FAMILY HISTORY and SOCIAL HISTORY, CURRENT MEDICATION LIST, and ALLERGY LIST reviewed by me (YVON Galaviz MD) and are updated consistent with the patient's report.    REVIEW OF SYSTEMS  CONSTITUTIONAL: No fever reported.  CARDIOVASCULAR: No chest pain reported.  PULMONARY: No trouble breathing reported.     PHYSICAL EXAM  Vitals:    11/09/18 0910   BP: 126/70   BP Location: Right arm   Patient Position: Sitting   BP Method: Large (Manual)   Pulse: 65   Temp: 98.1 °F (36.7 °C)   TempSrc: Tympanic   SpO2: 96%   Weight: (!) 142.9 kg (315 lb 0.6 oz)   Height: 6' 1" (1.854 m)     CONSTITUTIONAL: Vital signs noted. No apparent distress. Does not appear acutely ill or septic. Appears adequately hydrated.  HEENT: External ENT grossly unremarkable. Hearing grossly intact. Oropharynx moist.  PULM: Lungs clear. Breathing unlabored.  HEART: Auscultation reveals regular " rate and rhythm without murmur, gallop or rub.  DERM: Skin warm and moist with normal turgor.  NEURO: There are no gross focal motor deficits or gross deficits of cranial nerves III-XII.  PSYCHIATRIC: Alert and oriented x 3. Mood is grossly neutral. Affect appropriate. Judgment and insight not grossly compromised.    ASSESSMENT and PLAN  Adult T-cell leukemia in remission    Dyslipidemia with low high density lipoprotein (HDL) cholesterol with hypertriglyceridemia due to type 2 diabetes mellitus    Benign essential hypertension  -     losartan (COZAAR) 25 MG tablet; Take 1 tablet (25 mg total) by mouth once daily.  Dispense: 90 tablet; Refill: 3    Controlled type 2 diabetes mellitus with diabetic polyneuropathy, without long-term current use of insulin  -     Hemoglobin A1c; Future; Expected date: 02/09/2019    Granulocytopenia    Benign prostatic hyperplasia without lower urinary tract symptoms  -     tamsulosin (FLOMAX) 0.4 mg Cap; Take 1 capsule (0.4 mg total) by mouth once daily.  Dispense: 90 capsule; Refill: 3        PRESCRIPTION MEDICATION MANAGEMENT  Medications Ordered This Encounter   Medications    losartan (COZAAR) 25 MG tablet     Sig: Take 1 tablet (25 mg total) by mouth once daily.     Dispense:  90 tablet     Refill:  3    tamsulosin (FLOMAX) 0.4 mg Cap     Sig: Take 1 capsule (0.4 mg total) by mouth once daily.     Dispense:  90 capsule     Refill:  3     Medications Discontinued During This Encounter   Medication Reason    losartan (COZAAR) 25 MG tablet Reorder    tamsulosin (FLOMAX) 0.4 mg Cp24 Reorder       Follow-up in about 3 months (around 2/9/2019) for review test results and discuss treatment plan.    There are no Patient Instructions on file for this visit.    ABOUT THIS DOCUMENTATION:  · The order of the conditions listed in the HPI is one of convenience and does not necessarily reflect the chronology of the appointment, nor the relative importance of a condition.  · Documentation  "entered by me for this encounter was done in part using speech-recognition technology. Although I have made an effort to ensure accuracy, "sound like" errors may exist and should be interpreted in context.                        -YVON Galaviz MD     "

## 2018-11-14 ENCOUNTER — PATIENT MESSAGE (OUTPATIENT)
Dept: INTERNAL MEDICINE | Facility: CLINIC | Age: 68
End: 2018-11-14

## 2018-11-14 PROBLEM — E80.6 HYPERBILIRUBINEMIA: Status: ACTIVE | Noted: 2018-11-14

## 2018-11-14 NOTE — PROGRESS NOTES
"TASK: Please read Patient Portal Message (below), fax labs to Dr. Brown and call his nurse to let her know that Robin needs hematology clearance for surgery, and then contact Robin to verify his receipt and understanding. Thanks.   --------------------------------------------------------------------------------    Robin Lane.    I reviewed your lab test results. Your lab results are ACCEPTABLE for your planned upcoming surgery EXCEPT that your neutrophil (a type of white blood cells) count is LOW.    From my standpoint, it is acceptable to proceed with the surgery after you have been gotten preoperative clearance from your hematologist, Dr. Brown. I'm asking my staff to fax your recent lab results to Dr. Brown. You need to contact Dr. Brown to find out if he will need you to come in for an appointment to be cleared for surgery.    If you have any questions about your test results, write them down and bring them with you to your next appointment. You can call or message me in the meantime if you have urgent questions.    Thank you for letting me care for you. I look forward to seeing you at your next appointment.    Sincerely,    YVON Galaviz MD    P.S. - Want to learn more about your test results and what they mean? It's as simple as 1, 2, 3.     (1) Log in to your [facility] account at [email or web address]     (2) From the "View test results" tab, click on the test you want to know more about.     (3) Click on the "About This Test" link."

## 2018-11-15 ENCOUNTER — PATIENT MESSAGE (OUTPATIENT)
Dept: INTERNAL MEDICINE | Facility: CLINIC | Age: 68
End: 2018-11-15

## 2018-11-16 ENCOUNTER — PATIENT MESSAGE (OUTPATIENT)
Dept: INTERNAL MEDICINE | Facility: CLINIC | Age: 68
End: 2018-11-16

## 2018-11-16 NOTE — TELEPHONE ENCOUNTER
Patient was in on 11-9-18 and forgot to ask for refill on his Tramadol, please send to pharmacy, surgery is scheduled for his rotator cuff repair this monday

## 2018-11-20 ENCOUNTER — TELEPHONE (OUTPATIENT)
Dept: INTERNAL MEDICINE | Facility: CLINIC | Age: 68
End: 2018-11-20

## 2018-11-20 RX ORDER — TRAMADOL HYDROCHLORIDE 50 MG/1
TABLET ORAL
OUTPATIENT
Start: 2018-11-20

## 2018-11-20 NOTE — TELEPHONE ENCOUNTER
----- Message from Killian Solomon sent at 11/20/2018 12:41 PM CST -----  Contact: pt   Pt is requesting a call back from the nurse in regards to the pt med that is not at the pharmacy  [phone] (home)

## 2018-11-27 ENCOUNTER — TELEPHONE (OUTPATIENT)
Dept: INTERNAL MEDICINE | Facility: CLINIC | Age: 68
End: 2018-11-27

## 2018-12-03 ENCOUNTER — TELEPHONE (OUTPATIENT)
Dept: INTERNAL MEDICINE | Facility: CLINIC | Age: 68
End: 2018-12-03

## 2018-12-03 DIAGNOSIS — B35.1 ONYCHOMYCOSIS OF TOENAIL: Primary | ICD-10-CM

## 2018-12-03 NOTE — TELEPHONE ENCOUNTER
----- Message from Emerita Marti sent at 12/3/2018  9:32 AM CST -----  Contact: self [phone]  States that he is returning call. Please call back at [phone]//thank you acc

## 2018-12-03 NOTE — TELEPHONE ENCOUNTER
Verified pharmacy with patient who is very upset stating he still hasn't received a medication for his toe fungus that was suppose to be prescribed over a month ago. Please send in prescription for toe fungus as advised per result note

## 2018-12-05 ENCOUNTER — TELEPHONE (OUTPATIENT)
Dept: INTERNAL MEDICINE | Facility: CLINIC | Age: 68
End: 2018-12-05

## 2018-12-05 RX ORDER — TERBINAFINE HYDROCHLORIDE 250 MG/1
250 TABLET ORAL DAILY
Qty: 120 TABLET | Refills: 0 | Status: SHIPPED | OUTPATIENT
Start: 2018-12-05 | End: 2019-02-01

## 2018-12-05 NOTE — TELEPHONE ENCOUNTER
Patient calls and states he would like the rx called out for the toenail fungus, verified his pharmacy, please send as soon as possible

## 2018-12-05 NOTE — TELEPHONE ENCOUNTER
----- Message from Riya Marks sent at 12/5/2018  1:13 PM CST -----  Contact: pt  He's calling to discuss medications, please advise [phone]

## 2018-12-05 NOTE — TELEPHONE ENCOUNTER
Please notify Robin. Thanks.    Medications Ordered This Encounter   Medications    terbinafine HCl (LAMISIL) 250 mg tablet     Sig: Take 1 tablet (250 mg total) by mouth once daily.     Dispense:  120 tablet     Refill:  0

## 2018-12-10 ENCOUNTER — PATIENT MESSAGE (OUTPATIENT)
Dept: INTERNAL MEDICINE | Facility: CLINIC | Age: 68
End: 2018-12-10

## 2018-12-10 DIAGNOSIS — G89.29 OTHER CHRONIC PAIN: Primary | Chronic | ICD-10-CM

## 2018-12-10 NOTE — TELEPHONE ENCOUNTER
Patient calls states his pain management doctor retired requesting refills or do you want to see him in office

## 2018-12-11 NOTE — TELEPHONE ENCOUNTER
On 12/05/2018 I sent electronic prescription for terbinafine HCl (LAMISIL) 250 mg tablet, a quantity of 120 tablet(s) with 0 additional refills to [id number] - [facility] - [id number] AT [facility] [address]. The pharmacy confirmed their receipt of the prescription 12/5/2018 at 12:19 PM CST. If he wants to fill the prescription at a different pharmacy, he can tell his pharmacist to contact the other pharmacy to have the prescription transferred.

## 2018-12-17 ENCOUNTER — PATIENT MESSAGE (OUTPATIENT)
Dept: INTERNAL MEDICINE | Facility: CLINIC | Age: 68
End: 2018-12-17

## 2018-12-17 DIAGNOSIS — G89.29 OTHER CHRONIC PAIN: Chronic | ICD-10-CM

## 2018-12-17 RX ORDER — CELECOXIB 200 MG/1
200 CAPSULE ORAL DAILY PRN
Qty: 30 CAPSULE | Refills: 0 | Status: SHIPPED | OUTPATIENT
Start: 2018-12-17 | End: 2019-01-25 | Stop reason: SDUPTHER

## 2018-12-17 RX ORDER — TRAMADOL HYDROCHLORIDE 50 MG/1
50-100 TABLET ORAL EVERY 8 HOURS PRN
Qty: 240 TABLET | Refills: 0 | Status: SHIPPED | OUTPATIENT
Start: 2018-12-17

## 2018-12-17 NOTE — TELEPHONE ENCOUNTER
TASK: Please read Patient Portal Message (below), and then contact him to verify his receipt and understanding. Thanks.  --------------------------------------------------------------------------------  Robin Lane.    I refilled your tramadol and celecoxib.    Please schedule an appointment with me before you need additional refills so we can discuss whether or not I will be able to meet your pain management needs. If not, I can refer you to a pain management specialist.    Thanks for letting me care for you.    Sincerely,    YVON Galaviz MD    Medications Ordered This Encounter   Medications    celecoxib (CELEBREX) 200 MG capsule     Sig: Take 1 capsule (200 mg total) by mouth daily as needed for Pain.     Dispense:  30 capsule     Refill:  0    traMADol (ULTRAM) 50 mg tablet     Sig: Take 1-2 tablets ( mg total) by mouth every 8 (eight) hours as needed for Pain. - DO NOT TAKE IF ALSO TAKING HYDROCODONE     Dispense:  240 tablet     Refill:  0

## 2018-12-19 RX ORDER — CELECOXIB 200 MG/1
CAPSULE ORAL
Qty: 90 CAPSULE | Refills: 0 | OUTPATIENT
Start: 2018-12-19

## 2019-01-04 DIAGNOSIS — B35.1 ONYCHOMYCOSIS OF TOENAIL: ICD-10-CM

## 2019-01-06 RX ORDER — TERBINAFINE HYDROCHLORIDE 250 MG/1
TABLET ORAL
Qty: 120 TABLET | Refills: 0 | OUTPATIENT
Start: 2019-01-06

## 2019-01-07 NOTE — TELEPHONE ENCOUNTER
Look at the entry for this drug in his Chart Review > Current Medicines. If I am reading it correctly, he should not need a refill of the drug at this point.    TASK:  If the pharmacy requesting this refill is the pharmacy to which the eRx was sent, please call and review the above information with the pharmacist and verify their receipt of the prescription. Assuming they have the prescription, instruct the pharmacist to call Robin and notify him when the prescription will be ready for pickup. If they do NOT have record of the eRx, then PEND a reorder request for this medicine and route it to me with a message that the pharmacist reported no record of the above eRx.    If the pharmacy requesting this refill is NOT the pharmacy to which the eRx was sent, instruct the requesting pharmacist to contact the pharmacy originally receiving the prescription and request that the prescription be transferred, and ask the pharmacist to call Robin and notify him when the prescription will be ready for pickup. (Although we legally can send a duplicate new prescription to a different pharmacy, we try to avoid this when possible, because it increases the chances of medication errors.)    Thanks!    YVON Galaviz MD

## 2019-01-16 DIAGNOSIS — F41.1 GENERALIZED ANXIETY DISORDER: Chronic | ICD-10-CM

## 2019-01-17 RX ORDER — CLONAZEPAM 1 MG/1
TABLET ORAL
Qty: 30 TABLET | Refills: 1 | Status: SHIPPED | OUTPATIENT
Start: 2019-01-17 | End: 2019-03-20 | Stop reason: SDUPTHER

## 2019-01-17 NOTE — TELEPHONE ENCOUNTER
Robin Lane.     I received a request for me to refill your clonazepam. I sent you a prescription to the pharmacy listed on the request.    You will need to come in for re-evaluation before I can give you any more refills.      Sincerely,       YVON Galaviz MD      P.S. - I sent your refills to the pharmacy listed on the refill request. If you want to fill the prescription at a different pharmacy, tell your pharmacist to contact the other pharmacy to have the prescription transferred.  ----------------------------------------  ----------------------------------------   Medications Ordered This Encounter   Medications    clonazePAM (KLONOPIN) 1 MG tablet     Sig: TAKE 1 TABLET BY MOUTH EVERY EVENING     Dispense:  30 tablet     Refill:  1

## 2019-01-23 ENCOUNTER — PATIENT OUTREACH (OUTPATIENT)
Dept: ADMINISTRATIVE | Facility: HOSPITAL | Age: 69
End: 2019-01-23

## 2019-01-23 ENCOUNTER — PATIENT MESSAGE (OUTPATIENT)
Dept: ADMINISTRATIVE | Facility: HOSPITAL | Age: 69
End: 2019-01-23

## 2019-01-25 DIAGNOSIS — G89.29 OTHER CHRONIC PAIN: Chronic | ICD-10-CM

## 2019-01-29 RX ORDER — CELECOXIB 200 MG/1
CAPSULE ORAL
Qty: 30 CAPSULE | Refills: 1 | Status: SHIPPED | OUTPATIENT
Start: 2019-01-29

## 2019-01-30 NOTE — TELEPHONE ENCOUNTER
Patient pharmacy request refill on celebrex. Looking through chart in December of 2018 Dr. Galaviz refilled then for 30 days but advised patient has to come in to be re-evaluated for pain management before given additional refills. Patient has not been seen since November. Spoke with patient and advised of this and apologized to patient, patient was a little hostile advising this is ridiculous. I apologized once again to patient and advised I can ask Dr. Galaviz to refill it but theres a chance he may not due to patient supposed to be seen again before another refill. Patient verbalized understanding and scheduled appointment for Friday 2/1/19 at 8:40 am at patient convenience.

## 2019-02-01 ENCOUNTER — OFFICE VISIT (OUTPATIENT)
Dept: INTERNAL MEDICINE | Facility: CLINIC | Age: 69
End: 2019-02-01
Payer: MEDICARE

## 2019-02-01 VITALS
BODY MASS INDEX: 41.75 KG/M2 | SYSTOLIC BLOOD PRESSURE: 134 MMHG | DIASTOLIC BLOOD PRESSURE: 84 MMHG | HEIGHT: 73 IN | HEART RATE: 90 BPM | WEIGHT: 315 LBS | OXYGEN SATURATION: 98 % | TEMPERATURE: 97 F

## 2019-02-01 DIAGNOSIS — I10 BENIGN ESSENTIAL HYPERTENSION: Primary | Chronic | ICD-10-CM

## 2019-02-01 DIAGNOSIS — R79.89 LOW TESTOSTERONE: ICD-10-CM

## 2019-02-01 DIAGNOSIS — E80.6 HYPERBILIRUBINEMIA: ICD-10-CM

## 2019-02-01 DIAGNOSIS — E66.01 MORBID OBESITY WITH BMI OF 40.0-44.9, ADULT: Chronic | ICD-10-CM

## 2019-02-01 DIAGNOSIS — E78.2 DYSLIPIDEMIA WITH LOW HIGH DENSITY LIPOPROTEIN (HDL) CHOLESTEROL WITH HYPERTRIGLYCERIDEMIA DUE TO TYPE 2 DIABETES MELLITUS: ICD-10-CM

## 2019-02-01 DIAGNOSIS — E11.69 DYSLIPIDEMIA WITH LOW HIGH DENSITY LIPOPROTEIN (HDL) CHOLESTEROL WITH HYPERTRIGLYCERIDEMIA DUE TO TYPE 2 DIABETES MELLITUS: ICD-10-CM

## 2019-02-01 DIAGNOSIS — D70.9 GRANULOCYTOPENIA: Chronic | ICD-10-CM

## 2019-02-01 DIAGNOSIS — G47.33 OBSTRUCTIVE SLEEP APNEA TREATED WITH CONTINUOUS POSITIVE AIRWAY PRESSURE (CPAP): Chronic | ICD-10-CM

## 2019-02-01 DIAGNOSIS — E11.42 CONTROLLED TYPE 2 DIABETES MELLITUS WITH DIABETIC POLYNEUROPATHY, WITHOUT LONG-TERM CURRENT USE OF INSULIN: Chronic | ICD-10-CM

## 2019-02-01 DIAGNOSIS — M19.91 PRIMARY LOCALIZED OSTEOARTHROSIS OF MULTIPLE SITES: Chronic | ICD-10-CM

## 2019-02-01 PROCEDURE — 99214 OFFICE O/P EST MOD 30 MIN: CPT | Mod: PBBFAC,PN | Performed by: FAMILY MEDICINE

## 2019-02-01 PROCEDURE — 99214 OFFICE O/P EST MOD 30 MIN: CPT | Mod: S$PBB,,, | Performed by: FAMILY MEDICINE

## 2019-02-01 PROCEDURE — 99999 PR PBB SHADOW E&M-EST. PATIENT-LVL IV: ICD-10-PCS | Mod: PBBFAC,,, | Performed by: FAMILY MEDICINE

## 2019-02-01 PROCEDURE — 99999 PR PBB SHADOW E&M-EST. PATIENT-LVL IV: CPT | Mod: PBBFAC,,, | Performed by: FAMILY MEDICINE

## 2019-02-01 PROCEDURE — 99214 PR OFFICE/OUTPT VISIT, EST, LEVL IV, 30-39 MIN: ICD-10-PCS | Mod: S$PBB,,, | Performed by: FAMILY MEDICINE

## 2019-02-01 NOTE — ASSESSMENT & PLAN NOTE
Wt Readings from Last 5 Encounters:   02/01/19 (!) 143.4 kg (316 lb 2.2 oz)   11/09/18 (!) 142.9 kg (315 lb 0.6 oz)   11/02/18 (!) 144.6 kg (318 lb 12.6 oz)   09/06/18 (!) 139.1 kg (306 lb 10.6 oz)   07/26/18 (!) 141.8 kg (312 lb 9.8 oz)   This condition appears to be MODESTLY WORSE. Therapeutic lifestyle changes encouraged.

## 2019-02-01 NOTE — ASSESSMENT & PLAN NOTE
Lab Results   Component Value Date    HGBA1C 6.8 (H) 11/06/2018    HGBA1C 6.3 (H) 05/29/2018    ESTGFRAFRICA >60.0 11/06/2018    EGFRNONAA >60.0 11/06/2018     BP Readings from Last 1 Encounters:   02/01/19 134/84     Wt Readings from Last 3 Encounters:   02/01/19 (!) 143.4 kg (316 lb 2.2 oz)   11/09/18 (!) 142.9 kg (315 lb 0.6 oz)   11/02/18 (!) 144.6 kg (318 lb 12.6 oz)     Body mass index is 41.71 kg/m².    HEALTH MAINTENANCE: Diabetic health maintenance interventions reviewed and are up to date except for:  There are no preventive care reminders to display for this patient.     This condition is ASYMPTOMATIC.

## 2019-02-01 NOTE — ASSESSMENT & PLAN NOTE
Lab Results   Component Value Date    CHOL 148 05/29/2018    TRIG 189 (H) 05/29/2018    TRIG 155 (H) 01/12/2018    HDL 36 (L) 05/29/2018    HDL 33 (L) 01/12/2018    LDLCALC 74.2 05/29/2018    LDLCALC 73 01/12/2018    NONHDLCHOL 112 05/29/2018    AST 18 11/06/2018    ALT 18 11/06/2018     Wt Readings from Last 2 Encounters:   02/01/19 (!) 143.4 kg (316 lb 2.2 oz)   11/09/18 (!) 142.9 kg (315 lb 0.6 oz)     Body mass index is 41.71 kg/m².  The 10-year ASCVD risk score (Emblemmario PERKINS Jr., et al., 2013) is: 36.3%    Values used to calculate the score:      Age: 68 years      Sex: Male      Is Non- : No      Diabetic: Yes      Tobacco smoker: No      Systolic Blood Pressure: 134 mmHg      Is BP treated: Yes      HDL Cholesterol: 36 mg/dL      Total Cholesterol: 148 mg/dL     He says he did not tolerate atorvastatin due to side effects (bloating).  He says he is willing to try another statin after we review his repeat lipid panel.

## 2019-02-01 NOTE — ASSESSMENT & PLAN NOTE
He says that Celebrex is the only thing that works well during the winter.  He says he infrequently uses tramadol, and very rarely uses his hydrocodone. He is demonstrating no behaviors to suggest inappropriate use of prescribed medications. [place] Board  Pharmacy Controlled Prescription Drug Monitoring database was queried and showed no activity to suggest abuse, diversion, or other inappropriate use of prescription medications.

## 2019-02-01 NOTE — ASSESSMENT & PLAN NOTE
This condition is ASYMPTOMATIC.  He says he has follow-up appointment with his hematologist next week.  I am requesting that he have his hematologist share his test results with me.

## 2019-02-01 NOTE — ASSESSMENT & PLAN NOTE
Lab Results   Component Value Date    TOTALTESTOST 321 11/06/2018    HGB 11.6 (L) 11/06/2018    HCT 34.5 (L) 11/06/2018   This condition appears to be STABLE.

## 2019-02-01 NOTE — ASSESSMENT & PLAN NOTE
Lab Results   Component Value Date    AST 18 11/06/2018    AST 28 05/29/2018    ALT 18 11/06/2018    ALT 48 (H) 05/29/2018    ALKPHOS 80 11/06/2018    ALKPHOS 80 05/29/2018    BILITOT 1.3 (H) 11/06/2018    BILITOT 1.0 05/29/2018    ALBUMIN 3.5 11/06/2018   This condition is ASYMPTOMATIC. No jaundice.

## 2019-02-04 ENCOUNTER — LAB VISIT (OUTPATIENT)
Dept: LAB | Facility: HOSPITAL | Age: 69
End: 2019-02-04
Attending: FAMILY MEDICINE
Payer: MEDICARE

## 2019-02-04 DIAGNOSIS — I10 BENIGN ESSENTIAL HYPERTENSION: Chronic | ICD-10-CM

## 2019-02-04 DIAGNOSIS — E11.69 DYSLIPIDEMIA WITH LOW HIGH DENSITY LIPOPROTEIN (HDL) CHOLESTEROL WITH HYPERTRIGLYCERIDEMIA DUE TO TYPE 2 DIABETES MELLITUS: ICD-10-CM

## 2019-02-04 DIAGNOSIS — E11.42 CONTROLLED TYPE 2 DIABETES MELLITUS WITH DIABETIC POLYNEUROPATHY, WITHOUT LONG-TERM CURRENT USE OF INSULIN: Chronic | ICD-10-CM

## 2019-02-04 DIAGNOSIS — E78.2 DYSLIPIDEMIA WITH LOW HIGH DENSITY LIPOPROTEIN (HDL) CHOLESTEROL WITH HYPERTRIGLYCERIDEMIA DUE TO TYPE 2 DIABETES MELLITUS: ICD-10-CM

## 2019-02-04 DIAGNOSIS — E80.6 HYPERBILIRUBINEMIA: Primary | ICD-10-CM

## 2019-02-04 LAB
ALBUMIN SERPL BCP-MCNC: 3.7 G/DL
ALP SERPL-CCNC: 85 U/L
ALT SERPL W/O P-5'-P-CCNC: 27 U/L
ANION GAP SERPL CALC-SCNC: 8 MMOL/L
AST SERPL-CCNC: 21 U/L
BILIRUB SERPL-MCNC: 1.5 MG/DL
BUN SERPL-MCNC: 14 MG/DL
CALCIUM SERPL-MCNC: 9.2 MG/DL
CHLORIDE SERPL-SCNC: 108 MMOL/L
CHOLEST SERPL-MCNC: 170 MG/DL
CHOLEST/HDLC SERPL: 5.5 {RATIO}
CO2 SERPL-SCNC: 24 MMOL/L
CREAT SERPL-MCNC: 0.8 MG/DL
EST. GFR  (AFRICAN AMERICAN): >60 ML/MIN/1.73 M^2
EST. GFR  (NON AFRICAN AMERICAN): >60 ML/MIN/1.73 M^2
ESTIMATED AVG GLUCOSE: 154 MG/DL
GLUCOSE SERPL-MCNC: 219 MG/DL
HBA1C MFR BLD HPLC: 7 %
HDLC SERPL-MCNC: 31 MG/DL
HDLC SERPL: 18.2 %
LDLC SERPL CALC-MCNC: ABNORMAL MG/DL
NONHDLC SERPL-MCNC: 139 MG/DL
POTASSIUM SERPL-SCNC: 4.3 MMOL/L
PROT SERPL-MCNC: 7.2 G/DL
SODIUM SERPL-SCNC: 140 MMOL/L
TRIGL SERPL-MCNC: 467 MG/DL

## 2019-02-04 PROCEDURE — 83036 HEMOGLOBIN GLYCOSYLATED A1C: CPT

## 2019-02-04 PROCEDURE — 80061 LIPID PANEL: CPT

## 2019-02-04 PROCEDURE — 36415 COLL VENOUS BLD VENIPUNCTURE: CPT

## 2019-02-04 PROCEDURE — 80053 COMPREHEN METABOLIC PANEL: CPT

## 2019-02-06 ENCOUNTER — TELEPHONE (OUTPATIENT)
Dept: INTERNAL MEDICINE | Facility: CLINIC | Age: 69
End: 2019-02-06

## 2019-02-06 NOTE — PROGRESS NOTES
See patient portal note about these results. Please contact him to schedule hepatic function panel and liver ultrasound I ordered. He can get them done at his earliest convenience, but definitely within 2 months. Thanks.

## 2019-02-06 NOTE — TELEPHONE ENCOUNTER
----- Message from Malka Peralta sent at 2/6/2019  8:53 AM CST -----  Contact: Pt   Pt is calling regarding requesting to have nurse call Pt. Pt state that he needs a copy of current  labs to bring to leukemia Doctor. Pt state that he has this appt today. .[phone]            ..Thank You  Kiki Peralta

## 2019-02-06 NOTE — TELEPHONE ENCOUNTER
Spoke with patient who advised he would like his results faxed to Dr. Morales his oncologist. Faxed results and also gave results over the phone to patient.   Scheduled patient for ordered test by Dr. Galaviz, patient also scheduled sooner appointment with Dr. Galaviz to talk about everything in person. I asked patient to bring records from Dr. Morales OV and testing that will be done today and I will notify Dr. Galaviz about this conversation. Patient verbalized understanding.

## 2019-02-07 ENCOUNTER — HOSPITAL ENCOUNTER (OUTPATIENT)
Dept: RADIOLOGY | Facility: HOSPITAL | Age: 69
Discharge: HOME | End: 2019-02-07
Attending: FAMILY MEDICINE
Payer: MEDICARE

## 2019-02-07 DIAGNOSIS — E80.6 HYPERBILIRUBINEMIA: ICD-10-CM

## 2019-02-07 PROBLEM — K80.20 CALCULUS OF GALLBLADDER WITHOUT CHOLECYSTITIS WITHOUT OBSTRUCTION: Chronic | Status: ACTIVE | Noted: 2019-02-07

## 2019-02-07 PROBLEM — K76.0 NON-ALCOHOLIC FATTY LIVER DISEASE: Chronic | Status: ACTIVE | Noted: 2019-02-07

## 2019-02-07 PROCEDURE — 76705 US ABDOMEN LIMITED: ICD-10-PCS | Mod: 26,,, | Performed by: RADIOLOGY

## 2019-02-07 PROCEDURE — 76705 ECHO EXAM OF ABDOMEN: CPT | Mod: 26,,, | Performed by: RADIOLOGY

## 2019-02-07 PROCEDURE — 76705 ECHO EXAM OF ABDOMEN: CPT | Mod: TC

## 2019-02-07 NOTE — PROGRESS NOTES
CHIEF COMPLAINT  Follow-up      HISTORY OF PRESENT ILLNESS    Problem List Items Addressed This Visit        Cardiac/Vascular    Dyslipidemia with low high density lipoprotein (HDL) cholesterol with hypertriglyceridemia due to type 2 diabetes mellitus    Current Assessment & Plan     Lab Results   Component Value Date    CHOL 148 05/29/2018    TRIG 189 (H) 05/29/2018    TRIG 155 (H) 01/12/2018    HDL 36 (L) 05/29/2018    HDL 33 (L) 01/12/2018    LDLCALC 74.2 05/29/2018    LDLCALC 73 01/12/2018    NONHDLCHOL 112 05/29/2018    AST 18 11/06/2018    ALT 18 11/06/2018     Wt Readings from Last 2 Encounters:   02/01/19 (!) 143.4 kg (316 lb 2.2 oz)   11/09/18 (!) 142.9 kg (315 lb 0.6 oz)     Body mass index is 41.71 kg/m².  The 10-year ASCVD risk score (Ramon PERKINS Jr., et al., 2013) is: 36.3%    Values used to calculate the score:      Age: 68 years      Sex: Male      Is Non- : No      Diabetic: Yes      Tobacco smoker: No      Systolic Blood Pressure: 134 mmHg      Is BP treated: Yes      HDL Cholesterol: 36 mg/dL      Total Cholesterol: 148 mg/dL     He says he did not tolerate atorvastatin due to side effects (bloating).  He says he is willing to try another statin after we review his repeat lipid panel.         Relevant Orders    Comprehensive metabolic panel (Completed)    Lipid panel (Completed)    Benign essential hypertension - Primary (Chronic)    Relevant Orders    Comprehensive metabolic panel (Completed)       Oncology    Granulocytopenia (Chronic)    Current Assessment & Plan     This condition is ASYMPTOMATIC.  He says he has follow-up appointment with his hematologist next week.  I am requesting that he have his hematologist share his test results with me.            Endocrine    Low testosterone    Current Assessment & Plan     Lab Results   Component Value Date    TOTALTESTOST 321 11/06/2018    HGB 11.6 (L) 11/06/2018    HCT 34.5 (L) 11/06/2018   This condition appears to be STABLE.          Controlled type 2 diabetes mellitus with diabetic polyneuropathy, without long-term current use of insulin (Chronic)    Current Assessment & Plan     Lab Results   Component Value Date    HGBA1C 6.8 (H) 11/06/2018    HGBA1C 6.3 (H) 05/29/2018    ESTGFRAFRICA >60.0 11/06/2018    EGFRNONAA >60.0 11/06/2018     BP Readings from Last 1 Encounters:   02/01/19 134/84     Wt Readings from Last 3 Encounters:   02/01/19 (!) 143.4 kg (316 lb 2.2 oz)   11/09/18 (!) 142.9 kg (315 lb 0.6 oz)   11/02/18 (!) 144.6 kg (318 lb 12.6 oz)     Body mass index is 41.71 kg/m².    HEALTH MAINTENANCE: Diabetic health maintenance interventions reviewed and are up to date except for:  There are no preventive care reminders to display for this patient.     This condition is ASYMPTOMATIC.         Relevant Orders    Comprehensive metabolic panel (Completed)    Hemoglobin A1c (Completed)    Morbid obesity with BMI of 40.0-44.9, adult (Chronic)    Current Assessment & Plan     Wt Readings from Last 5 Encounters:   02/01/19 (!) 143.4 kg (316 lb 2.2 oz)   11/09/18 (!) 142.9 kg (315 lb 0.6 oz)   11/02/18 (!) 144.6 kg (318 lb 12.6 oz)   09/06/18 (!) 139.1 kg (306 lb 10.6 oz)   07/26/18 (!) 141.8 kg (312 lb 9.8 oz)   This condition appears to be MODESTLY WORSE. Therapeutic lifestyle changes encouraged.             Orthopedic    Primary localized osteoarthrosis of multiple sites (Chronic)    Current Assessment & Plan     He says that Celebrex is the only thing that works well during the winter.  He says he infrequently uses tramadol, and very rarely uses his hydrocodone. He is demonstrating no behaviors to suggest inappropriate use of prescribed medications. [place] Board of Pharmacy Controlled Prescription Drug Monitoring database was queried and showed no activity to suggest abuse, diversion, or other inappropriate use of prescription medications.            Other    Hyperbilirubinemia    Current Assessment & Plan     Lab Results   Component  "Value Date    AST 18 11/06/2018    AST 28 05/29/2018    ALT 18 11/06/2018    ALT 48 (H) 05/29/2018    ALKPHOS 80 11/06/2018    ALKPHOS 80 05/29/2018    BILITOT 1.3 (H) 11/06/2018    BILITOT 1.0 05/29/2018    ALBUMIN 3.5 11/06/2018   This condition is ASYMPTOMATIC. No jaundice.         Relevant Orders    Comprehensive metabolic panel (Completed)    Obstructive sleep apnea treated with continuous positive airway pressure (CPAP) (Chronic)    Current Assessment & Plan     He reports compliance with use of CPAP for treatment of obstructive sleep apnea, and he reports perceived therapeutic benefit.                  REVIEW OF SYSTEMS  CONSTITUTIONAL: No fever or chills reported.   CARDIOVASCULAR: No angina or orthopnea reported.   ENDOCRINE: No polyuria or polydipsia reported.     PHYSICAL EXAM  Vitals:    02/01/19 0903   BP: 134/84   BP Location: Left arm   Patient Position: Sitting   BP Method: Medium (Manual)   Pulse: 90   Temp: 96.8 °F (36 °C)   TempSrc: Tympanic   SpO2: 98%   Weight: (!) 143.4 kg (316 lb 2.2 oz)   Height: 6' 1" (1.854 m)     CONSTITUTIONAL: Vital signs noted. No apparent distress. Does not appear acutely ill or septic. Appears adequately hydrated.  HEENT: External ENT grossly unremarkable. Hearing grossly intact. Oropharynx moist.  PULM: Lungs clear. Breathing unlabored.  HEART: Auscultation reveals regular rate and rhythm without murmur, gallop or rub.  DERM: Skin warm and moist with normal turgor.  PSYCHIATRIC: Alert and conversant. Mood grossly neutral. Judgment and insight not grossly compromised.     ASSESSMENT & PLAN (DIAGNOSES & ORDERS)  1. Benign essential hypertension  Comprehensive metabolic panel   2. Dyslipidemia with low high density lipoprotein (HDL) cholesterol with hypertriglyceridemia due to type 2 diabetes mellitus  Comprehensive metabolic panel    Lipid panel   3. Controlled type 2 diabetes mellitus with diabetic polyneuropathy, without long-term current use of insulin  " "Comprehensive metabolic panel    Hemoglobin A1c   4. Low testosterone     5. Obstructive sleep apnea treated with continuous positive airway pressure (CPAP)     6. Hyperbilirubinemia  Comprehensive metabolic panel   7. Primary localized osteoarthrosis of multiple sites     8. Morbid obesity with BMI of 40.0-44.9, adult     9. Granulocytopenia  CANCELED: CBC auto differential       Follow-up in about 3 months (around 5/1/2019) for re-evaluate problem(s) discussed today.    Documentation entered by me for this encounter may have been done in part using speech-recognition technology. Although I have made an effort to ensure accuracy, "sound like" errors may exist and should be interpreted in context. -YVON Galaviz MD      There are no Patient Instructions on file for this visit.   "

## 2019-02-08 NOTE — PROGRESS NOTES
"[address]in [address].    Your liver ultrasound showed that you have a fatty liver (high amount of fat deposition in your liver) and incidental gallstones.  We will discuss your test results further at your next appointment.    If you have any questions about your test results, write them down and bring them with you to your next appointment. You can call or message me in the meantime if you have urgent questions.    Thank you for letting me care for you. I look forward to seeing you at your next appointment.    Sincerely,    YVON Galaviz MD    P.S. - Want to learn more about your test results and what they mean? It's as simple as 1, 2, 3.     (1) Log in to your [facility] account at [email or web address]     (2) From the "View test results" tab, click on the test you want to know more about.     (3) Click on the "About This Test" link."

## 2019-03-12 ENCOUNTER — LAB VISIT (OUTPATIENT)
Dept: LAB | Facility: HOSPITAL | Age: 69
End: 2019-03-12
Attending: PAIN MEDICINE
Payer: MEDICARE

## 2019-03-12 DIAGNOSIS — M54.16 LUMBAR RADICULOPATHY: ICD-10-CM

## 2019-03-12 DIAGNOSIS — M54.16 LUMBAR RADICULOPATHY: Primary | ICD-10-CM

## 2019-03-12 DIAGNOSIS — M54.12 BRACHIAL NEURITIS: ICD-10-CM

## 2019-03-12 DIAGNOSIS — M96.1 POSTLAMINECTOMY SYNDROME, CERVICAL REGION: ICD-10-CM

## 2019-03-12 LAB
ALBUMIN SERPL BCP-MCNC: 3.7 G/DL
ALP SERPL-CCNC: 91 U/L
ALT SERPL W/O P-5'-P-CCNC: 43 U/L
ANION GAP SERPL CALC-SCNC: 7 MMOL/L
AST SERPL-CCNC: 31 U/L
BASOPHILS # BLD AUTO: 0.01 K/UL
BASOPHILS NFR BLD: 0.4 %
BILIRUB DIRECT SERPL-MCNC: 0.6 MG/DL
BILIRUB SERPL-MCNC: 1.6 MG/DL
BUN SERPL-MCNC: 15 MG/DL
CALCIUM SERPL-MCNC: 9.5 MG/DL
CHLORIDE SERPL-SCNC: 108 MMOL/L
CO2 SERPL-SCNC: 26 MMOL/L
CREAT SERPL-MCNC: 0.8 MG/DL
DIFFERENTIAL METHOD: ABNORMAL
EOSINOPHIL # BLD AUTO: 0 K/UL
EOSINOPHIL NFR BLD: 0.4 %
ERYTHROCYTE [DISTWIDTH] IN BLOOD BY AUTOMATED COUNT: 15.2 %
EST. GFR  (AFRICAN AMERICAN): >60 ML/MIN/1.73 M^2
EST. GFR  (NON AFRICAN AMERICAN): >60 ML/MIN/1.73 M^2
GLUCOSE SERPL-MCNC: 141 MG/DL
HCT VFR BLD AUTO: 34.1 %
HGB BLD-MCNC: 11.3 G/DL
IMM GRANULOCYTES # BLD AUTO: 0.01 K/UL
IMM GRANULOCYTES NFR BLD AUTO: 0.4 %
LYMPHOCYTES # BLD AUTO: 0.9 K/UL
LYMPHOCYTES NFR BLD: 41.7 %
MCH RBC QN AUTO: 33 PG
MCHC RBC AUTO-ENTMCNC: 33.1 G/DL
MCV RBC AUTO: 100 FL
MONOCYTES # BLD AUTO: 0.2 K/UL
MONOCYTES NFR BLD: 9.4 %
NEUTROPHILS # BLD AUTO: 1.1 K/UL
NEUTROPHILS NFR BLD: 47.7 %
NRBC BLD-RTO: 0 /100 WBC
PHOSPHATE SERPL-MCNC: 2.6 MG/DL
PLATELET # BLD AUTO: 168 K/UL
PMV BLD AUTO: 9.7 FL
POTASSIUM SERPL-SCNC: 4.4 MMOL/L
PROT SERPL-MCNC: 7 G/DL
RBC # BLD AUTO: 3.42 M/UL
SODIUM SERPL-SCNC: 141 MMOL/L
WBC # BLD AUTO: 2.23 K/UL

## 2019-03-12 PROCEDURE — 36415 COLL VENOUS BLD VENIPUNCTURE: CPT

## 2019-03-12 PROCEDURE — 85025 COMPLETE CBC W/AUTO DIFF WBC: CPT

## 2019-03-12 PROCEDURE — 84100 ASSAY OF PHOSPHORUS: CPT

## 2019-03-12 PROCEDURE — 80053 COMPREHEN METABOLIC PANEL: CPT

## 2019-03-12 PROCEDURE — 82248 BILIRUBIN DIRECT: CPT

## 2019-03-14 ENCOUNTER — PATIENT MESSAGE (OUTPATIENT)
Dept: INTERNAL MEDICINE | Facility: CLINIC | Age: 69
End: 2019-03-14

## 2019-03-15 PROCEDURE — 81374 HLA I TYPING 1 ANTIGEN LR: CPT

## 2019-03-19 DIAGNOSIS — F41.1 GENERALIZED ANXIETY DISORDER: Chronic | ICD-10-CM

## 2019-03-20 ENCOUNTER — PATIENT MESSAGE (OUTPATIENT)
Dept: INTERNAL MEDICINE | Facility: CLINIC | Age: 69
End: 2019-03-20

## 2019-03-20 DIAGNOSIS — F41.1 GENERALIZED ANXIETY DISORDER: Chronic | ICD-10-CM

## 2019-03-20 LAB
HLA-B27 RELATED AG QL: NEGATIVE
HLA-B27 RELATED AG QL: NORMAL

## 2019-03-22 RX ORDER — CLONAZEPAM 1 MG/1
1 TABLET ORAL NIGHTLY
Qty: 30 TABLET | Refills: 1 | Status: SHIPPED | OUTPATIENT
Start: 2019-03-22 | End: 2019-05-17 | Stop reason: SDUPTHER

## 2019-03-26 RX ORDER — CLONAZEPAM 1 MG/1
TABLET ORAL
Qty: 30 TABLET | Refills: 0 | OUTPATIENT
Start: 2019-03-26

## 2019-04-22 DIAGNOSIS — N40.0 BENIGN PROSTATIC HYPERPLASIA WITHOUT LOWER URINARY TRACT SYMPTOMS: Chronic | ICD-10-CM

## 2019-04-22 DIAGNOSIS — F41.1 GENERALIZED ANXIETY DISORDER: Chronic | ICD-10-CM

## 2019-04-22 RX ORDER — CLONAZEPAM 1 MG/1
1 TABLET ORAL NIGHTLY
Qty: 30 TABLET | Refills: 1 | Status: CANCELLED | OUTPATIENT
Start: 2019-04-22

## 2019-04-23 RX ORDER — DICYCLOMINE HYDROCHLORIDE 10 MG/1
CAPSULE ORAL
Refills: 2 | COMMUNITY
Start: 2019-04-02

## 2019-04-23 RX ORDER — HYDROMORPHONE HYDROCHLORIDE 2 MG/1
TABLET ORAL
Refills: 0 | COMMUNITY
Start: 2019-03-22

## 2019-04-23 RX ORDER — GLIMEPIRIDE 2 MG/1
2 TABLET ORAL EVERY MORNING
Qty: 90 TABLET | Refills: 3 | Status: CANCELLED | OUTPATIENT
Start: 2019-04-23

## 2019-04-23 RX ORDER — CYCLOPHOSPHAMIDE 50 MG/1
CAPSULE ORAL
COMMUNITY
Start: 2019-04-12

## 2019-04-23 RX ORDER — CEPHALEXIN 500 MG/1
CAPSULE ORAL
Refills: 0 | COMMUNITY
Start: 2019-03-30

## 2019-04-23 RX ORDER — FLUOCINONIDE 0.5 MG/G
CREAM TOPICAL
COMMUNITY
Start: 2019-02-19 | End: 2020-02-19

## 2019-04-23 RX ORDER — TAMSULOSIN HYDROCHLORIDE 0.4 MG/1
0.4 CAPSULE ORAL DAILY
Qty: 90 CAPSULE | Refills: 3 | Status: CANCELLED | OUTPATIENT
Start: 2019-04-23

## 2019-04-23 RX ORDER — CLOTRIMAZOLE AND BETAMETHASONE DIPROPIONATE 10; .64 MG/G; MG/G
CREAM TOPICAL
Refills: 0 | COMMUNITY
Start: 2019-02-01

## 2019-04-23 RX ORDER — LORAZEPAM 2 MG/1
TABLET ORAL
Refills: 0 | COMMUNITY
Start: 2019-03-20

## 2019-04-23 RX ORDER — BETAMETHASONE DIPROPIONATE 0.5 MG/G
OINTMENT TOPICAL
Refills: 2 | COMMUNITY
Start: 2019-02-19

## 2019-04-24 ENCOUNTER — TELEPHONE (OUTPATIENT)
Dept: INTERNAL MEDICINE | Facility: CLINIC | Age: 69
End: 2019-04-24

## 2019-04-24 NOTE — TELEPHONE ENCOUNTER
Attempted to call patient no answer, [facility]. Also sent [employer] message  
I did not approve refill requests for clonazepam, glimepiride, and tamsulosin.  REASON:  At his last appointment, he was instructed to schedule follow-up appointment with me in early May.  He has sufficient supply of these medications remaining to last until late May.    TASK: Please contact him to let him know the above and remind him to be sure to have his next appointment with me prior to needing any additional refills.  Thanks.  
Last Office Visit- 02/01/2019  Next Visit- 5/20/2019 ANGEL    
Med/pharmacy set up for MD to route if applicable.  
1 or 2

## 2019-05-17 DIAGNOSIS — F41.1 GENERALIZED ANXIETY DISORDER: Chronic | ICD-10-CM

## 2019-05-17 DIAGNOSIS — Z78.9 STATIN INTOLERANCE: Chronic | ICD-10-CM

## 2019-05-21 DIAGNOSIS — E11.42 CONTROLLED TYPE 2 DIABETES MELLITUS WITH DIABETIC POLYNEUROPATHY, WITHOUT LONG-TERM CURRENT USE OF INSULIN: Chronic | ICD-10-CM

## 2019-05-21 PROBLEM — Z78.9 STATIN INTOLERANCE: Chronic | Status: ACTIVE | Noted: 2019-05-21

## 2019-05-21 RX ORDER — CLONAZEPAM 1 MG/1
1 TABLET ORAL NIGHTLY PRN
Qty: 30 TABLET | Refills: 5 | Status: SHIPPED | OUTPATIENT
Start: 2019-05-21

## 2019-05-21 NOTE — TELEPHONE ENCOUNTER
ACTION NEEDED: Please call him to let him know I sent him a refill and ask to see how he is doing.  Offer him a follow-up appointment.    Medications Ordered This Encounter   Medications    clonazePAM (KLONOPIN) 1 MG tablet     Sig: Take 1 tablet (1 mg total) by mouth nightly as needed.     Dispense:  30 tablet     Refill:  5

## 2019-05-21 NOTE — PROGRESS NOTES
In response to faxed refill request.  Medications Ordered This Encounter   Medications    SITagliptin (JANUVIA) 100 MG Tab     Sig: Take 1 tablet (100 mg total) by mouth once daily.     Dispense:  90 tablet     Refill:  1     Order Specific Question:   This medication typically requires a prior authorization. For prior auth services, patient financial assistance, patient education and medication management send to an [facility] retail pharmacy.     Answer:   No, I will select a different retail pharmacy

## 2019-05-21 NOTE — TELEPHONE ENCOUNTER
Called and left a voicemail for patient that they have medication refills available and to call us back for a follow up appointment.

## 2019-07-09 ENCOUNTER — TELEPHONE (OUTPATIENT)
Dept: INTERNAL MEDICINE | Facility: CLINIC | Age: 69
End: 2019-07-09

## 2019-07-09 DIAGNOSIS — E80.6 HYPERBILIRUBINEMIA: ICD-10-CM

## 2019-07-09 DIAGNOSIS — E11.42 CONTROLLED TYPE 2 DIABETES MELLITUS WITH DIABETIC POLYNEUROPATHY, WITHOUT LONG-TERM CURRENT USE OF INSULIN: Chronic | ICD-10-CM

## 2019-07-09 DIAGNOSIS — K76.0 NON-ALCOHOLIC FATTY LIVER DISEASE: Chronic | ICD-10-CM

## 2019-07-09 DIAGNOSIS — D64.9 ANEMIA, UNSPECIFIED TYPE: ICD-10-CM

## 2019-07-09 DIAGNOSIS — D70.9 GRANULOCYTOPENIA: Primary | Chronic | ICD-10-CM

## 2019-07-10 RX ORDER — GLIMEPIRIDE 2 MG/1
2 TABLET ORAL EVERY MORNING
Qty: 90 TABLET | Refills: 0 | Status: SHIPPED | OUTPATIENT
Start: 2019-07-10

## 2019-07-10 NOTE — TELEPHONE ENCOUNTER
He is overdue for labs (ordered below), and appears that he is overdue for diabetic eye exam.    ACTION NEEDED: Please notify him of limited refill provided, schedule him for labs as ordered below, and schedule him for appointment with me a few days after having labs drawn.  Schedule him for diabetic eye exam unless he has had this done elsewhere within the last 11 months. Thanks.     Orders Placed This Encounter    Hemoglobin A1c    Microalbumin/creatinine urine ratio    Hepatic function panel    CBC auto differential    Ambulatory referral to Optometry        Medications Ordered This Encounter   Medications    glimepiride (AMARYL) 2 MG tablet     Sig: Take 1 tablet (2 mg total) by mouth every morning. - LABS AND APPOINTMENT REQUIRED FOR MORE REFILLS     Dispense:  90 tablet     Refill:  0

## 2019-07-11 NOTE — TELEPHONE ENCOUNTER
Spoke with patient and informed him per Dr. Galaviz that he sent in a 90 day supply of his medication and that he will need blood work and a follow up appointment with Dr. Galaviz before any additional refills can be dispensed. I offered to set these appointments up for him, however he refused and said he will call back later to do it. He also said he already had his diabetic eye exam done.

## 2019-09-07 ENCOUNTER — TELEPHONE (OUTPATIENT)
Dept: INTERNAL MEDICINE | Facility: CLINIC | Age: 69
End: 2019-09-07

## 2019-09-07 DIAGNOSIS — E78.2 DYSLIPIDEMIA WITH LOW HIGH DENSITY LIPOPROTEIN (HDL) CHOLESTEROL WITH HYPERTRIGLYCERIDEMIA DUE TO TYPE 2 DIABETES MELLITUS: Primary | ICD-10-CM

## 2019-09-07 DIAGNOSIS — K76.0 NON-ALCOHOLIC FATTY LIVER DISEASE: Chronic | ICD-10-CM

## 2019-09-07 DIAGNOSIS — E80.6 HYPERBILIRUBINEMIA: ICD-10-CM

## 2019-09-07 DIAGNOSIS — E11.69 DYSLIPIDEMIA WITH LOW HIGH DENSITY LIPOPROTEIN (HDL) CHOLESTEROL WITH HYPERTRIGLYCERIDEMIA DUE TO TYPE 2 DIABETES MELLITUS: Primary | ICD-10-CM

## 2019-09-07 DIAGNOSIS — E11.69 TYPE 2 DIABETES MELLITUS WITH OTHER SPECIFIED COMPLICATION, WITHOUT LONG-TERM CURRENT USE OF INSULIN: ICD-10-CM

## 2019-09-07 DIAGNOSIS — I10 BENIGN ESSENTIAL HYPERTENSION: Chronic | ICD-10-CM

## 2019-09-07 DIAGNOSIS — G47.33 OBSTRUCTIVE SLEEP APNEA TREATED WITH CONTINUOUS POSITIVE AIRWAY PRESSURE (CPAP): Chronic | ICD-10-CM

## 2019-09-10 RX ORDER — LOSARTAN POTASSIUM 25 MG/1
25 TABLET ORAL DAILY
Qty: 30 TABLET | Refills: 0 | Status: SHIPPED | OUTPATIENT
Start: 2019-09-10 | End: 2019-09-11 | Stop reason: SDUPTHER

## 2019-09-10 NOTE — TELEPHONE ENCOUNTER
I am unable to give him any further refills until he comes in and allows me to re-evaluate him.    ACTION NEEDED: Please contact him to let him know and help him schedule labs (below) and a follow-up appointment a few days thereafter.    Orders Placed This Encounter    CBC auto differential    Hepatic function panel    Hemoglobin A1c    Microalbumin/creatinine urine ratio    Lipid panel      Requested Prescriptions     Signed Prescriptions Disp Refills    losartan (COZAAR) 25 MG tablet 30 tablet 0     Sig: Take 1 tablet (25 mg total) by mouth once daily. - LABS AND APPOINTMENT REQUIRED FOR MORE REFILLS     Authorizing Provider: YVON GARCIA

## 2019-09-10 NOTE — TELEPHONE ENCOUNTER
Spoke with patient and informed him of one month refill of Losartan and needed labs and follow up with Dr. Galaviz. He stated that he is in the hospital now and just had his gallbladder removed, however when he gets out and feels better, he will call and schedule those appointments.

## 2019-09-11 DIAGNOSIS — I10 BENIGN ESSENTIAL HYPERTENSION: Chronic | ICD-10-CM

## 2019-09-11 RX ORDER — LOSARTAN POTASSIUM 25 MG/1
25 TABLET ORAL DAILY
Qty: 30 TABLET | Refills: 0 | Status: SHIPPED | OUTPATIENT
Start: 2019-09-11

## 2019-09-11 NOTE — TELEPHONE ENCOUNTER
Refill approved.  Appointment required prior to additional refills.  Medications Ordered This Encounter   Medications    losartan (COZAAR) 25 MG tablet     Sig: Take 1 tablet (25 mg total) by mouth once daily. - APPOINTMENT REQUIRED FOR MORE REFILLS     Dispense:  30 tablet     Refill:  0     Dispense only 30-day QTY. Do not request 90-day QTY. Please notify him when prescription ready to  and instruct him that APPOINTMENT IS REQUIRED FOR ADDITIONAL REFILLS. Thank you.

## 2019-09-13 DIAGNOSIS — I10 BENIGN ESSENTIAL HYPERTENSION: Chronic | ICD-10-CM

## 2019-09-18 RX ORDER — LOSARTAN POTASSIUM 25 MG/1
TABLET ORAL
Qty: 90 TABLET | Refills: 0 | OUTPATIENT
Start: 2019-09-18

## 2019-10-07 ENCOUNTER — TELEPHONE (OUTPATIENT)
Dept: INTERNAL MEDICINE | Facility: CLINIC | Age: 69
End: 2019-10-07

## 2019-10-07 DIAGNOSIS — E11.42 CONTROLLED TYPE 2 DIABETES MELLITUS WITH DIABETIC POLYNEUROPATHY, WITHOUT LONG-TERM CURRENT USE OF INSULIN: Chronic | ICD-10-CM

## 2019-10-07 RX ORDER — GLIMEPIRIDE 2 MG/1
TABLET ORAL
Qty: 90 TABLET | Refills: 0 | OUTPATIENT
Start: 2019-10-07

## 2019-10-07 NOTE — TELEPHONE ENCOUNTER
Called pt, did not get an answer, left a message asking to call back to schedule labs and appointment so he can get a refill

## 2019-10-07 NOTE — TELEPHONE ENCOUNTER
Refill refused. REASON: Refer to my 7/9/2019 and 9/7/2019 telephone encounters.    ACTION NEEDED: Please contact him to schedule labs and appointments as instructed in my 7/9/2019 and 9/7/2019 telephone encounters. Thanks.    Requested Prescriptions     Refused Prescriptions Disp Refills    glimepiride (AMARYL) 2 MG tablet [Pharmacy Med Name: GLIMEPIRIDE 2MG TABLETS] 90 tablet 0     Sig: TAKE 1 TABLET BY MOUTH EVERY MORNING     Refused By: BIANCA GARCIA     Reason for Refusal: Patient needs an appointment

## 2019-10-08 ENCOUNTER — TELEPHONE (OUTPATIENT)
Dept: INTERNAL MEDICINE | Facility: CLINIC | Age: 69
End: 2019-10-08

## 2019-10-08 NOTE — TELEPHONE ENCOUNTER
----- Message from Shima Aranda sent at 10/8/2019  3:41 PM CDT -----  Contact: pt  The pt states he had found another doctor and to please stop calling him///Shalini

## 2019-10-08 NOTE — TELEPHONE ENCOUNTER
----- Message from Shayy Horton sent at 10/8/2019  9:31 AM CDT -----  Contact: PT  States he needs to speak to the nurse. He wouldn't say what it was regarding. Please call pt at [phone]. Thank you

## 2019-12-06 DIAGNOSIS — E11.42 CONTROLLED TYPE 2 DIABETES MELLITUS WITH DIABETIC POLYNEUROPATHY, WITHOUT LONG-TERM CURRENT USE OF INSULIN: Chronic | ICD-10-CM

## 2019-12-06 RX ORDER — SITAGLIPTIN 100 MG/1
TABLET, FILM COATED ORAL
Qty: 90 TABLET | Refills: 0 | OUTPATIENT
Start: 2019-12-06

## 2019-12-06 NOTE — TELEPHONE ENCOUNTER
REFILL REFUSED. REASON: REFER TO my Telephone Encounter dated July 9, 2019.       Requested Prescriptions     Refused Prescriptions Disp Refills    JANUVIA 100 mg Tab [Pharmacy Med Name: JANUVIA 100MG TABLETS] 90 tablet 0     Sig: TAKE 1 TABLET(100 MG) BY MOUTH EVERY DAY     Refused By: BIANCA GARCIA     Reason for Refusal: Patient needs an appointment

## 2020-02-06 DIAGNOSIS — E11.42 CONTROLLED TYPE 2 DIABETES MELLITUS WITH DIABETIC POLYNEUROPATHY, WITHOUT LONG-TERM CURRENT USE OF INSULIN: Chronic | ICD-10-CM

## 2020-02-10 RX ORDER — SITAGLIPTIN 100 MG/1
TABLET, FILM COATED ORAL
Qty: 90 TABLET | Refills: 1 | OUTPATIENT
Start: 2020-02-10

## 2020-02-10 NOTE — TELEPHONE ENCOUNTER
Please contact him to let him know that I am unable to give him any refills of this medicine until he allows me to re-evaluate him with an appointment. Offer to schedule an appointment for him if he doesn't already have one scheduled. Thanks.    Requested Prescriptions   Pending Prescriptions Disp Refills    JANUVIA 100 mg Tab [Pharmacy Med Name: JANUVIA 100MG TABLETS] 90 tablet 1     Sig: TAKE 1 TABLET(100 MG) BY MOUTH EVERY DAY

## 2020-02-10 NOTE — TELEPHONE ENCOUNTER
Called and left a message for patient that he will need an appointment with Dr. Galaviz before he can get any medication refills.

## 2020-05-14 ENCOUNTER — PATIENT OUTREACH (OUTPATIENT)
Dept: ADMINISTRATIVE | Facility: HOSPITAL | Age: 70
End: 2020-05-14

## 2020-06-25 DIAGNOSIS — E11.42 CONTROLLED TYPE 2 DIABETES MELLITUS WITH DIABETIC POLYNEUROPATHY, WITHOUT LONG-TERM CURRENT USE OF INSULIN: Chronic | ICD-10-CM

## 2020-09-08 RX ORDER — LANCETS 28 GAUGE
EACH MISCELLANEOUS
Qty: 100 EACH | OUTPATIENT
Start: 2020-09-08

## 2020-09-08 NOTE — TELEPHONE ENCOUNTER
REFILL REFUSED  REASON: It has been more than a year since I last treated him. He is overdue for important health maintenance interventions. Appointment for re-evaluation is needed prior to additional refills.    Requested Prescriptions     Refused Prescriptions Disp Refills    FREESTYLE LANCETS 28 gauge lancets [Pharmacy Med Name: FREESTYLE LANCETS 100] 100 each      Sig: TEST ONCE DAILY     Refused By: BIANCA GARCIA     Reason for Refusal: Patient needs an appointment    #LMRX